# Patient Record
Sex: MALE | Race: OTHER | HISPANIC OR LATINO
[De-identification: names, ages, dates, MRNs, and addresses within clinical notes are randomized per-mention and may not be internally consistent; named-entity substitution may affect disease eponyms.]

---

## 2021-10-20 ENCOUNTER — TRANSCRIPTION ENCOUNTER (OUTPATIENT)
Age: 61
End: 2021-10-20

## 2021-10-21 ENCOUNTER — EMERGENCY (EMERGENCY)
Facility: HOSPITAL | Age: 61
LOS: 1 days | Discharge: HOME | End: 2021-10-21
Attending: INTERNAL MEDICINE | Admitting: INTERNAL MEDICINE
Payer: COMMERCIAL

## 2021-10-21 VITALS
SYSTOLIC BLOOD PRESSURE: 200 MMHG | HEIGHT: 66 IN | RESPIRATION RATE: 18 BRPM | WEIGHT: 180.78 LBS | HEART RATE: 84 BPM | TEMPERATURE: 98 F | OXYGEN SATURATION: 100 % | DIASTOLIC BLOOD PRESSURE: 103 MMHG

## 2021-10-21 PROCEDURE — 99284 EMERGENCY DEPT VISIT MOD MDM: CPT

## 2021-10-21 PROCEDURE — 99284 EMERGENCY DEPT VISIT MOD MDM: CPT | Mod: 25

## 2021-10-21 PROCEDURE — 12001 RPR S/N/AX/GEN/TRNK 2.5CM/<: CPT | Mod: XU

## 2021-10-21 PROCEDURE — 11740 EVACUATION SUBUNGUAL HMTMA: CPT | Mod: F2

## 2021-10-21 PROCEDURE — 90471 IMMUNIZATION ADMIN: CPT

## 2021-10-21 PROCEDURE — 73140 X-RAY EXAM OF FINGER(S): CPT

## 2021-10-21 PROCEDURE — 73140 X-RAY EXAM OF FINGER(S): CPT | Mod: 26,LT

## 2021-10-21 PROCEDURE — 90715 TDAP VACCINE 7 YRS/> IM: CPT

## 2021-10-21 RX ORDER — TETANUS TOXOID, REDUCED DIPHTHERIA TOXOID AND ACELLULAR PERTUSSIS VACCINE, ADSORBED 5; 2.5; 8; 8; 2.5 [IU]/.5ML; [IU]/.5ML; UG/.5ML; UG/.5ML; UG/.5ML
0.5 SUSPENSION INTRAMUSCULAR ONCE
Refills: 0 | Status: COMPLETED | OUTPATIENT
Start: 2021-10-21 | End: 2021-10-21

## 2021-10-21 RX ORDER — CEPHALEXIN 500 MG
1 CAPSULE ORAL
Qty: 14 | Refills: 0
Start: 2021-10-21 | End: 2021-10-27

## 2021-10-21 RX ORDER — IBUPROFEN 200 MG
600 TABLET ORAL ONCE
Refills: 0 | Status: COMPLETED | OUTPATIENT
Start: 2021-10-21 | End: 2021-10-21

## 2021-10-21 RX ADMIN — Medication 600 MILLIGRAM(S): at 12:02

## 2021-10-21 RX ADMIN — TETANUS TOXOID, REDUCED DIPHTHERIA TOXOID AND ACELLULAR PERTUSSIS VACCINE, ADSORBED 0.5 MILLILITER(S): 5; 2.5; 8; 8; 2.5 SUSPENSION INTRAMUSCULAR at 12:01

## 2021-10-21 RX ADMIN — Medication 1 TABLET(S): at 12:02

## 2021-10-21 NOTE — ED PROVIDER NOTE - ATTENDING CONTRIBUTION TO CARE
61 yr old male with hx of HTN presents with left 3rd finger laceration s/p cutting finger with saw while cutting wood prior to arrival. Denies any other injuries. Right hand dominant. Tetanus unknown.  left 3rd finger- + laceration noted through mid nail, NVI, positive ROM, positive radial pulse, less than 2 sec cap refill    xray reviewed, Dr. Bartholomew repaired lac. stable for dc and fu with Dr. Bartholomew. tetanus given in ED. dc with augmentin rx  Dr. Avila:  I have reviewed and discussed with the PA/ resident the case specifics, including the history, physical assessment, evaluation, conclusion, laboratory results, and medical plan. I agree with the contents, and conclusions. I have personally examined, and interviewed the patient.

## 2021-10-21 NOTE — ED PROVIDER NOTE - NSFOLLOWUPINSTRUCTIONS_ED_ALL_ED_FT
Follow up with Dr. Bartholomew :   Dr. Nazario Espinoza  [address] [place] [address] [address]  [place] [address]  [phone]  Fax: [phone]    Take augmentin as prescribed   Take motrin 400mg every 6 hours as needed for pain   Return to the ED if any worsening or persistent symptoms   Clean area as direct by Dr. Bartholomew.   You were given tetanus shot- your arm may be sore           Laceración del dedo    LO QUE NECESITA SABER:    ¿Qué es rossi laceración del dedo?Rossi laceración del dedo es un inga profundo en la piel. También pueden lesionarse los vasos sanguíneos, los huesos, las articulaciones, los tendones o los nervios.    ¿Cuáles son los signos y síntomas de rosis laceración del dedo?Los síntomas pueden depender de si los nervios, los tendones o los tejidos más profundos sufrieron lesiones. Puede presentar cualquiera de los siguientes signos o síntomas:   •Un inga, un desgarre o rossi herida de cuchillo en el dedo      •Sangrado, hinchazón o dolor      •Entumecimiento o sensación de hormigueo en el dedo      •Dificultad para  burger dedo      ¿Cómo se diagnostica rossi laceración del dedo?Informe a burger médico cómo sucedió la laceración. Burger médico examinará burger laceración y decidirá qué tratamiento necesita. Rossi radiografía, un ultrasonido o rossi tomografía computarizada podrían mostrar la presencia de un cuerpo extraño en la herida. Los objetos extraños incluyen un metal, gravilla y meredith. Los exámenes pueden también mostrar un daño al tejido profundo. Es posible que le administren un medio de contraste para que el área de la lesión se christi con mayor claridad en las imágenes. Dígale al médico si usted alguna vez ha tenido rossi reacción alérgica al líquido de contraste.    ¿Cómo se trata rossi laceración del dedo?El tratamiento depende del tamaño y la profundidad de la laceración. También depende de si tiene daños en los tejidos más profundos. Es posible que usted necesite alguno de los siguientes:   •La presiónpodría ser usada para ayudar a detener el sangrado.      •Limpieza de la heridapodría ser necesaria para retirar la suciedad o residuos. Captree disminuirá el riesgo de contraer rossi infección. Es posible que burger médico deba revisar la laceración en busca de objetos extraños o daños en los tejidos más profundos. Antes de que se le limpie y revise la laceración, se le pueden administrar medicamentos para adormecer la laxmi. Puede que también se le administre medicamento para ayudarlo a relajarse.      •Cerrar la heridacon puntos de sutura, pegamento médico o Steri-Strips™ puede ser necesario. Captree ayuda a que la herida cierre y sane. Se puede colocar rossi férula sobre los puntos de sutura, el pegamento o Steri-Strips. Captree ayudará a disminuir la tensión sobre la herida y evitar que se edelmira.             •Medicamentospuede administrarse para tratar el dolor o disminuir el riesgo de contraer rossi infección. Es posible que también le administren rossi vacuna contra el tétano. Burger médico determinará si usted necesita la inyección contra el tétano. Las heridas que presentan un alto riesgo de infección de tétano incluyen las heridas con suciedad o saliva en ellas. Informe a burger médico si usted ha tenido rossi vacuna contra el tétano o un refuerzo en los últimos 5 años.      •La cirugíapuede ser necesaria para limpiar la herida y extraer objetos extraños. También se puede necesitar rossi cirugía para reparar lesiones en los tendones, los nervios o los huesos.      ¿Qué puedo hacer para manejar mis síntomas?  •Aplique hieloen el dedo de 15 a 20 minutos cada hora o yusuf se le indique. Use rossi compresa de hielo o ponga hielo triturado en rossi bolsa de plástico. Cúbralo con rossi toalla antes de aplicarlo sobre burger piel. El hielo ayuda a evitar daño al tejido y a disminuir la inflamación y el dolor.      •Elevesu mano por encima del nivel de burger corazón tanto yusuf pueda. Captree va a disminuir inflamación y el dolor. Coloque burger mano sobre almohadas o cobijas para mantenerlas elevadas cómodamente.      •Use burger férula yusuf se le indique.Rossi férula lo inmovilizará y disminuirá la tensión sobre la herida. La férula puede ayudar a la herida a cicatrizar más rápido. Pregunte a burger médico cómo se debe colocar y retirar burger férula.      •Aplique pomadas para disminuir las cicatrices.No se aplique pomadas en la herida hasta que burger médico se lo indique. Es posible que necesite esperar hasta que la herida sane. Pregunte cuál pomada debe comprar y la frecuencia con que debe usarla.      ¿Cuándo dioni buscar atención inmediata?  •Burger herida se abre.      •La og empapa el vendaje.      •Tiene dolor intenso en el dedo o la mano.      •El dedo está pálido y frío.      •Tiene dificultad repentina para  el dedo.      •La inflamación empeora repentinamente.      •En la piel de burger herida le salen unas aleksandra butler.      ¿Cuándo dioni llamar a mi médico o especialista en micha?  •Comienza a tener entumecimiento o sensación de hormigueo.      •El dedo se siente caliente, se ve hinchado o figueroa, y drena pus.      •Tiene fiebre.      •Usted tiene preguntas o inquietudes acerca de burger condición o cuidado.      ACUERDOS SOBRE BURGER CUIDADO:    Usted tiene el derecho de ayudar a planear burger cuidado. Aprenda todo lo que pueda sobre burger condición y yusuf darle tratamiento. Discuta sung opciones de tratamiento con sung médicos para decidir el cuidado que usted desea recibir. Usted siempre tiene el derecho de rechazar el tratamiento.       © Copyright [employer] 2021           back to top                          © Copyright [employer] 2021

## 2021-10-21 NOTE — ED PROVIDER NOTE - PHYSICAL EXAMINATION
left 3rd finger- + laceration noted through mid nail, NVI, positive ROM, positive radial pulse, less than 2 sec cap refill

## 2021-10-21 NOTE — ED PROVIDER NOTE - PATIENT PORTAL LINK FT
You can access the FollowMyHealth Patient Portal offered by [facility] by registering at the following website: [email or web address]. By joining [employer]’s FollowMyHealth portal, you will also be able to view your health information using other applications (apps) compatible with our system.

## 2021-10-21 NOTE — ED PROVIDER NOTE - CLINICAL SUMMARY MEDICAL DECISION MAKING FREE TEXT BOX
61 yr old male with hx of HTN presents with left 3rd finger laceration s/p cutting finger with saw while cutting wood prior to arrival. Denies any other injuries. Right hand dominant. Tetanus unknown.  left 3rd finger- + laceration noted through mid nail, NVI, positive ROM, positive radial pulse, less than 2 sec cap refill 61 yr old male with hx of HTN presents with left 3rd finger laceration s/p cutting finger with saw while cutting wood prior to arrival. Denies any other injuries. Right hand dominant. Tetanus unknown.  left 3rd finger- + laceration noted through mid nail, NVI, positive ROM, positive radial pulse, less than 2 sec cap refill    xray reviewed, Dr. Bartholomew repaired lac. stable for dc and fu with Dr. Bartholomew. tetanus given in ED. dc with augmentin rx

## 2021-10-21 NOTE — ED PROVIDER NOTE - OBJECTIVE STATEMENT
61 yr old male with hx of HTN presents with left 3rd finger laceration s/p cutting finger with saw while cutting wood prior to arrival. Denies any other injuries. Right hand dominant. Tetanus unknown.

## 2022-11-10 PROBLEM — I10 ESSENTIAL (PRIMARY) HYPERTENSION: Chronic | Status: ACTIVE | Noted: 2021-10-21

## 2022-11-14 ENCOUNTER — APPOINTMENT (OUTPATIENT)
Dept: ORTHOPEDIC SURGERY | Facility: CLINIC | Age: 62
End: 2022-11-14

## 2022-11-14 VITALS — WEIGHT: 178 LBS | BODY MASS INDEX: 26.36 KG/M2 | HEIGHT: 69 IN

## 2022-11-14 VITALS — HEIGHT: 69 IN | BODY MASS INDEX: 26.36 KG/M2 | WEIGHT: 178 LBS

## 2022-11-14 DIAGNOSIS — S61.213D LACERATION W/OUT FOREIGN BODY OF LEFT MIDDLE FINGER W/OUT DAMAGE TO NAIL, SUBSEQUENT ENCOUNTER: ICD-10-CM

## 2022-11-14 DIAGNOSIS — Z78.9 OTHER SPECIFIED HEALTH STATUS: ICD-10-CM

## 2022-11-14 PROCEDURE — 99455 WORK RELATED DISABILITY EXAM: CPT

## 2022-11-14 PROCEDURE — 99072 ADDL SUPL MATRL&STAF TM PHE: CPT

## 2022-11-14 NOTE — WORK
[Has the patient reached Maximum Medical Improvement? If yes, indicate date___] : Yes, on [unfilled] [Is there permanent partial impairment?] : Yes [FreeTextEntry6] : 5% l middle finger

## 2022-11-14 NOTE — HISTORY OF PRESENT ILLNESS
[Work related] : work related [0] : 0 [Full time] : Work status: full time [] : no [FreeTextEntry1] : left hand [FreeTextEntry5] : 11/14 achy with gripping [de-identified] : 11/16/21 [de-identified] : Ben VARGAS [de-identified] : xrays

## 2023-06-18 ENCOUNTER — INPATIENT (INPATIENT)
Facility: HOSPITAL | Age: 63
LOS: 1 days | Discharge: HOME | DRG: 69 | End: 2023-06-20
Attending: HOSPITALIST | Admitting: FAMILY MEDICINE
Payer: COMMERCIAL

## 2023-06-18 VITALS
SYSTOLIC BLOOD PRESSURE: 141 MMHG | HEART RATE: 73 BPM | TEMPERATURE: 97 F | OXYGEN SATURATION: 100 % | WEIGHT: 182.54 LBS | DIASTOLIC BLOOD PRESSURE: 86 MMHG | HEIGHT: 68 IN | RESPIRATION RATE: 18 BRPM

## 2023-06-18 DIAGNOSIS — G45.9 TRANSIENT CEREBRAL ISCHEMIC ATTACK, UNSPECIFIED: ICD-10-CM

## 2023-06-18 DIAGNOSIS — I65.29 OCCLUSION AND STENOSIS OF UNSPECIFIED CAROTID ARTERY: Chronic | ICD-10-CM

## 2023-06-18 LAB
ALBUMIN SERPL ELPH-MCNC: 3.3 G/DL — SIGNIFICANT CHANGE UP (ref 3.3–5)
ALP SERPL-CCNC: 88 U/L — SIGNIFICANT CHANGE UP (ref 40–120)
ALT FLD-CCNC: 39 U/L — SIGNIFICANT CHANGE UP (ref 10–45)
ANION GAP SERPL CALC-SCNC: 8 MMOL/L — SIGNIFICANT CHANGE UP (ref 5–17)
APPEARANCE UR: CLEAR — SIGNIFICANT CHANGE UP
APTT BLD: 31.5 SEC — SIGNIFICANT CHANGE UP (ref 27.5–35.5)
AST SERPL-CCNC: 24 U/L — SIGNIFICANT CHANGE UP (ref 10–40)
BASOPHILS # BLD AUTO: 0.06 K/UL — SIGNIFICANT CHANGE UP (ref 0–0.2)
BASOPHILS NFR BLD AUTO: 0.8 % — SIGNIFICANT CHANGE UP (ref 0–2)
BILIRUB SERPL-MCNC: 0.2 MG/DL — SIGNIFICANT CHANGE UP (ref 0.2–1.2)
BILIRUB UR-MCNC: NEGATIVE — SIGNIFICANT CHANGE UP
BUN SERPL-MCNC: 15 MG/DL — SIGNIFICANT CHANGE UP (ref 7–23)
CALCIUM SERPL-MCNC: 8.8 MG/DL — SIGNIFICANT CHANGE UP (ref 8.4–10.5)
CHLORIDE SERPL-SCNC: 97 MMOL/L — SIGNIFICANT CHANGE UP (ref 96–108)
CO2 SERPL-SCNC: 27 MMOL/L — SIGNIFICANT CHANGE UP (ref 22–31)
COLOR SPEC: YELLOW — SIGNIFICANT CHANGE UP
CREAT SERPL-MCNC: 0.87 MG/DL — SIGNIFICANT CHANGE UP (ref 0.5–1.3)
DIFF PNL FLD: NEGATIVE — SIGNIFICANT CHANGE UP
EGFR: 97 ML/MIN/1.73M2 — SIGNIFICANT CHANGE UP
EOSINOPHIL # BLD AUTO: 0.22 K/UL — SIGNIFICANT CHANGE UP (ref 0–0.5)
EOSINOPHIL NFR BLD AUTO: 2.9 % — SIGNIFICANT CHANGE UP (ref 0–6)
GLUCOSE SERPL-MCNC: 100 MG/DL — HIGH (ref 70–99)
GLUCOSE UR QL: NEGATIVE MG/DL — SIGNIFICANT CHANGE UP
HCT VFR BLD CALC: 38.7 % — LOW (ref 39–50)
HGB BLD-MCNC: 13.6 G/DL — SIGNIFICANT CHANGE UP (ref 13–17)
IMM GRANULOCYTES NFR BLD AUTO: 0.5 % — SIGNIFICANT CHANGE UP (ref 0–0.9)
INR BLD: 1.18 RATIO — HIGH (ref 0.88–1.16)
KETONES UR-MCNC: NEGATIVE MG/DL — SIGNIFICANT CHANGE UP
LEUKOCYTE ESTERASE UR-ACNC: NEGATIVE — SIGNIFICANT CHANGE UP
LYMPHOCYTES # BLD AUTO: 1.91 K/UL — SIGNIFICANT CHANGE UP (ref 1–3.3)
LYMPHOCYTES # BLD AUTO: 25.1 % — SIGNIFICANT CHANGE UP (ref 13–44)
MCHC RBC-ENTMCNC: 31.2 PG — SIGNIFICANT CHANGE UP (ref 27–34)
MCHC RBC-ENTMCNC: 35.1 GM/DL — SIGNIFICANT CHANGE UP (ref 32–36)
MCV RBC AUTO: 88.8 FL — SIGNIFICANT CHANGE UP (ref 80–100)
MONOCYTES # BLD AUTO: 0.92 K/UL — HIGH (ref 0–0.9)
MONOCYTES NFR BLD AUTO: 12.1 % — SIGNIFICANT CHANGE UP (ref 2–14)
NEUTROPHILS # BLD AUTO: 4.46 K/UL — SIGNIFICANT CHANGE UP (ref 1.8–7.4)
NEUTROPHILS NFR BLD AUTO: 58.6 % — SIGNIFICANT CHANGE UP (ref 43–77)
NITRITE UR-MCNC: NEGATIVE — SIGNIFICANT CHANGE UP
NRBC # BLD: 0 /100 WBCS — SIGNIFICANT CHANGE UP (ref 0–0)
PH UR: 7 — SIGNIFICANT CHANGE UP (ref 5–8)
PLATELET # BLD AUTO: 235 K/UL — SIGNIFICANT CHANGE UP (ref 150–400)
POTASSIUM SERPL-MCNC: 3.7 MMOL/L — SIGNIFICANT CHANGE UP (ref 3.5–5.3)
POTASSIUM SERPL-SCNC: 3.7 MMOL/L — SIGNIFICANT CHANGE UP (ref 3.5–5.3)
PROT SERPL-MCNC: 7.1 G/DL — SIGNIFICANT CHANGE UP (ref 6–8.3)
PROT UR-MCNC: NEGATIVE MG/DL — SIGNIFICANT CHANGE UP
PROTHROM AB SERPL-ACNC: 13.7 SEC — HIGH (ref 10.5–13.4)
RBC # BLD: 4.36 M/UL — SIGNIFICANT CHANGE UP (ref 4.2–5.8)
RBC # FLD: 11.7 % — SIGNIFICANT CHANGE UP (ref 10.3–14.5)
SODIUM SERPL-SCNC: 132 MMOL/L — LOW (ref 135–145)
SP GR SPEC: 1.02 — SIGNIFICANT CHANGE UP (ref 1–1.03)
TROPONIN I, HIGH SENSITIVITY RESULT: 19.1 NG/L — SIGNIFICANT CHANGE UP
TROPONIN I, HIGH SENSITIVITY RESULT: 20 NG/L — SIGNIFICANT CHANGE UP
UROBILINOGEN FLD QL: 0.2 MG/DL — SIGNIFICANT CHANGE UP (ref 0.2–1)
WBC # BLD: 7.61 K/UL — SIGNIFICANT CHANGE UP (ref 3.8–10.5)
WBC # FLD AUTO: 7.61 K/UL — SIGNIFICANT CHANGE UP (ref 3.8–10.5)

## 2023-06-18 PROCEDURE — 71045 X-RAY EXAM CHEST 1 VIEW: CPT | Mod: 26

## 2023-06-18 PROCEDURE — 99223 1ST HOSP IP/OBS HIGH 75: CPT

## 2023-06-18 PROCEDURE — 99285 EMERGENCY DEPT VISIT HI MDM: CPT

## 2023-06-18 PROCEDURE — 70496 CT ANGIOGRAPHY HEAD: CPT | Mod: 26,MA

## 2023-06-18 PROCEDURE — 0042T: CPT | Mod: MA

## 2023-06-18 PROCEDURE — 93010 ELECTROCARDIOGRAM REPORT: CPT

## 2023-06-18 PROCEDURE — 70498 CT ANGIOGRAPHY NECK: CPT | Mod: 26,MA

## 2023-06-18 RX ORDER — SODIUM CHLORIDE 9 MG/ML
1000 INJECTION, SOLUTION INTRAVENOUS
Refills: 0 | Status: DISCONTINUED | OUTPATIENT
Start: 2023-06-18 | End: 2023-06-19

## 2023-06-18 RX ORDER — CLOPIDOGREL BISULFATE 75 MG/1
75 TABLET, FILM COATED ORAL DAILY
Refills: 0 | Status: DISCONTINUED | OUTPATIENT
Start: 2023-06-18 | End: 2023-06-19

## 2023-06-18 RX ORDER — ACETAMINOPHEN 500 MG
650 TABLET ORAL EVERY 6 HOURS
Refills: 0 | Status: DISCONTINUED | OUTPATIENT
Start: 2023-06-18 | End: 2023-06-20

## 2023-06-18 RX ORDER — ONDANSETRON 8 MG/1
4 TABLET, FILM COATED ORAL EVERY 8 HOURS
Refills: 0 | Status: DISCONTINUED | OUTPATIENT
Start: 2023-06-18 | End: 2023-06-20

## 2023-06-18 RX ORDER — ATORVASTATIN CALCIUM 80 MG/1
80 TABLET, FILM COATED ORAL AT BEDTIME
Refills: 0 | Status: DISCONTINUED | OUTPATIENT
Start: 2023-06-18 | End: 2023-06-20

## 2023-06-18 RX ORDER — PANTOPRAZOLE SODIUM 20 MG/1
40 TABLET, DELAYED RELEASE ORAL
Refills: 0 | Status: DISCONTINUED | OUTPATIENT
Start: 2023-06-18 | End: 2023-06-20

## 2023-06-18 RX ORDER — LOSARTAN POTASSIUM 100 MG/1
25 TABLET, FILM COATED ORAL DAILY
Refills: 0 | Status: DISCONTINUED | OUTPATIENT
Start: 2023-06-18 | End: 2023-06-20

## 2023-06-18 RX ORDER — METOPROLOL TARTRATE 50 MG
100 TABLET ORAL DAILY
Refills: 0 | Status: DISCONTINUED | OUTPATIENT
Start: 2023-06-18 | End: 2023-06-20

## 2023-06-18 RX ORDER — LANOLIN ALCOHOL/MO/W.PET/CERES
3 CREAM (GRAM) TOPICAL AT BEDTIME
Refills: 0 | Status: DISCONTINUED | OUTPATIENT
Start: 2023-06-18 | End: 2023-06-20

## 2023-06-18 RX ORDER — ENOXAPARIN SODIUM 100 MG/ML
40 INJECTION SUBCUTANEOUS EVERY 24 HOURS
Refills: 0 | Status: DISCONTINUED | OUTPATIENT
Start: 2023-06-18 | End: 2023-06-20

## 2023-06-18 RX ORDER — ASPIRIN/CALCIUM CARB/MAGNESIUM 324 MG
81 TABLET ORAL DAILY
Refills: 0 | Status: DISCONTINUED | OUTPATIENT
Start: 2023-06-18 | End: 2023-06-20

## 2023-06-18 RX ADMIN — ATORVASTATIN CALCIUM 80 MILLIGRAM(S): 80 TABLET, FILM COATED ORAL at 21:53

## 2023-06-18 RX ADMIN — ENOXAPARIN SODIUM 40 MILLIGRAM(S): 100 INJECTION SUBCUTANEOUS at 19:52

## 2023-06-18 RX ADMIN — CLOPIDOGREL BISULFATE 75 MILLIGRAM(S): 75 TABLET, FILM COATED ORAL at 21:54

## 2023-06-18 NOTE — ED PROVIDER NOTE - CLINICAL SUMMARY MEDICAL DECISION MAKING FREE TEXT BOX
Noncon CT negative.  Perfusion studies unremarkable.  There is a patent stent on the right ICA.  Left ICA consistent with prior CEA.  There is moderate focal stenosis of the origin of the left M2.  There is persistent fetal origin left PCA with a hypoplastic P1.  There is mild to moderate focal stenosis in the bilateral P2.  I discussed these findings with teleneurology, they recommended admission for MRI.  Patient is already on aspirin, neuro not recommending Plavix prior to MRI.  Patient remains at baseline, no deficits, NIH score 0. Case endorsed to hospitalist, patient excepted for admission to the floor. EKG shows sinus rhythm at 73, normal axis, normal intervals.  Incomplete right bundle noted, T wave inversions in the lateral leads, no obvious ischemic change.

## 2023-06-18 NOTE — ED ADULT TRIAGE NOTE - CHIEF COMPLAINT QUOTE
Patient c/o left arm, leg and facial numbness starting yesterday. Patient denies chest pain, SOB, headache, dizziness and blurry vision. Patient endorses a hx of carotid stenosis with stent placement x2. Last known well was 9:30 am. MD Simon called to triage CODE stroke call Patient c/o left arm, leg and facial numbness starting at 9:30am. Patient denies chest pain, SOB, headache, dizziness and blurry vision. Patient endorses a hx of carotid stenosis with stent placement x2. MD Simon called to triage CODE stroke called at

## 2023-06-18 NOTE — H&P ADULT - SOCIAL HISTORY: TOBACCO USE
Parent notify that patient's strep culture returned with negative results.  Parent verbalized understanding of results and improvement in patient's symptoms.  Instructed parent to contact clinic with any questions or concerns.  
1 pk daily x 50 yrs

## 2023-06-18 NOTE — ED PROVIDER NOTE - PHYSICAL EXAMINATION
Gen: Awake and Alert, in NAD  HEENT: Normocephalic, Atraumatic. PERRL, EOMI.  Neck: Supple, FROM. No audible bruit  CV: RRR, +S1/S2, No M/R/G  Pulm: Normal WOB. RR WNL. No wheeze, rhonchi, rales. No accessory muscle use.  Abd: S NT ND.  MSK: Moving all 4, no evidence of trauma  Skin: Normal color, temp, turgor. No rashes appreciated.  Neuro: AOx3, No focal deficits appreciated. No facial droop.  No flattening of the nasolabial fold.  No gaze preference.  No facial asymmetry.  Equal  strength in the upper extremities.  Equal strength against gravity in the bilateral lower extremities.  Follows basic commands.  Short-term memory intact.  CN II through XII intact.  No sensory deficits.

## 2023-06-18 NOTE — ED ADULT NURSE NOTE - NSFALLHARMRISKINTERV_ED_ALL_ED

## 2023-06-18 NOTE — H&P ADULT - NSHPLABSRESULTS_GEN_ALL_CORE
13.6                 132  | 27   | 15           7.61  >-----------< 235     ------------------------< 100                   38.7                 3.7  | 97   | 0.87                                         Ca 8.8   Mg x     Ph x      Urinalysis Basic - ( 2023 19:00 )    Color: Yellow / Appearance: Clear / S.022 / pH: x  Gluc: x / Ketone: Negative mg/dL  / Bili: Negative / Urobili: 0.2 mg/dL   Blood: x / Protein: Negative mg/dL / Nitrite: Negative   Leuk Esterase: Negative / RBC: x / WBC x   Sq Epi: x / Non Sq Epi: x / Bacteria: x          CAPILLARY BLOOD GLUCOSE          Labs reviewed:     CXR personally reviewed: napd    ECG reviewed and interpreted: sinus at 73    < from: CT Angio Neck Stroke Protocol w/ IV Cont (23 @ 17:45) >    IMPRESSION:    CT PERFUSION demonstrated: No core infarct. No active ischemia.  If symptoms persist consider follow up head CT or MRI, MRA  if no   contraindication.    CTA COW:  Moderate focal stenosis at the origin of one of the M2 segments   on the left MCA.  Persistent fetal origin left PCA with hypoplastic P1 segment.  Mild to moderate focal stenoses suspected in bilateral P2 segments.    CTA NECK: Patent, ECAs, ICAs, no  hemodynamically significant stenosis at    ICA origins by NASCET criteria. Right ICA stent appears to be patent.   Evidence suggesting left sided endarterectomy.  Bilateral vertebral arteries are patent without flow limiting stenosis.    < end of copied text >

## 2023-06-18 NOTE — H&P ADULT - ASSESSMENT
62-year-old male with past medical history of smoking  hypertension and bilateral carotid stenosis s/p stent (R) and CEA (L)  presents to the ED for evaluation of neurologic symptoms.  He is accompanied by his son who contributes this history.  States that around 930 this morning, while getting out of his car, he experienced a transient episode of left arm numbness, as well as numbness and drooping in the left side of his face.  His symptoms resolved quickly, and recurred several hours later after which she came to the emergency department.  Son notes that he also had some transient facial drooping noted yesterday. He has otherwise been in his normal state of health      left facial numbness and left sided UE weakness- resolved, multiple episodes since yesterday, r/o cva  admit to tele  neuro checks  am labs  passes bedside s/s eval   will need MRI in am  current smoker- cessation advised  ekg in am  tte in am  carotid doppler in am  CTH- Moderate focal stenosis at the origin of one of the M2 segments on the left MCA.Persistent fetal origin left PCA with hypoplastic P1 segment.Mild to moderate focal stenoses suspected in bilateral P2 segments.  ED discussed tcase with tele- neurology, they recommended admission for MRI.  Patient is already on aspirin, neuro not recommending Plavix prior to MRI.  Patient remains at baseline, no deficits, NIH score 0.   c/w asa, will start Plavix  initially refusing to stay- but agreed later.    HTN- c/w losartan, BB, clonidine    vte ppx- Lovenox    gi ppx- ppi

## 2023-06-18 NOTE — PATIENT PROFILE ADULT - FALL HARM RISK - HARM RISK INTERVENTIONS

## 2023-06-18 NOTE — ED ADULT NURSE NOTE - OBJECTIVE STATEMENT
Pt. to ED with son stating since around 9am he has had left side arm and leg numbness.  Pt. denies any at this time.  Pt. activated as code stroke by triage RN.  Facial symmetry noted, no arm drift noted.  Pt. ambulating in triage with steady gait.

## 2023-06-18 NOTE — ED ADULT TRIAGE NOTE - CODE STROKE DETAILS
Patient c/o left leg, arm and facial numbness starting at 0930 today. MD Simon called to triage CODE stroke activated as per MD.

## 2023-06-18 NOTE — H&P ADULT - HISTORY OF PRESENT ILLNESS
62-year-old male with past medical history of smoking  hypertension and bilateral carotid stenosis s/p stent (R) and CEA (L)  presents to the ED for evaluation of neurologic symptoms.  He is accompanied by his son who contributes this history.  States that around 930 this morning, while getting out of his car, he experienced a transient episode of left arm numbness, as well as numbness and drooping in the left side of his face.  His symptoms resolved quickly, and recurred several hours later after which she came to the emergency department.  Son notes that he also had some transient facial drooping noted yesterday. He has otherwise been in his normal state of health    DENIES LOC, SOB, CP. dysuria

## 2023-06-18 NOTE — ED PROVIDER NOTE - CARE PLAN
Assessment and plan of treatment:	This is a 62-year-old male presenting to the ED with symptoms consistent with TIA.  Current NIH stroke score is 0, and he is out of the tPA window given his onset of symptoms.  Plan for broad labs, noncon CT head, CT angio of the head and neck, and CT perfusion study.  Likely admit, may require transfer pending imaging findings.   Principal Discharge DX:	Transient ischemic attack (TIA)  Assessment and plan of treatment:	This is a 62-year-old male presenting to the ED with symptoms consistent with TIA.  Current NIH stroke score is 0, and he is out of the tPA window given his onset of symptoms.  Plan for broad labs, noncon CT head, CT angio of the head and neck, and CT perfusion study.  Likely admit, may require transfer pending imaging findings.   1

## 2023-06-18 NOTE — H&P ADULT - NSHPPHYSICALEXAM_GEN_ALL_CORE
Vital Signs Last 24 Hrs  T(C): 36.2 (18 Jun 2023 17:15), Max: 36.2 (18 Jun 2023 17:15)  T(F): 97.2 (18 Jun 2023 17:15), Max: 97.2 (18 Jun 2023 17:15)  HR: 66 (18 Jun 2023 18:46) (66 - 73)  BP: 158/85 (18 Jun 2023 18:46) (138/68 - 158/85)  BP(mean): --  RR: 17 (18 Jun 2023 18:46) (16 - 18)  SpO2: 96% (18 Jun 2023 18:46) (96% - 100%)    Parameters below as of 18 Jun 2023 18:46  Patient On (Oxygen Delivery Method): room air      GENERAL- NAD  EAR/NOSE/MOUTH/THROAT -  MMM  EYES- WEI, conjunctiva and Sclera clear  NECK- supple  RESPIRATORY-  clear to auscultation bilaterally, non laboured breathing  CARDIOVASCULAR - SIS2, RRR  GI - soft NT BS present  EXTREMITIES- no pedal edema  NEUROLOGY- no gross focal deficits  SKIN- no rashes, warm to touch  PSYCHIATRY- AAO X 3  MUSCULOSKELETAL- ROM normal

## 2023-06-18 NOTE — ED ADULT NURSE NOTE - CHIEF COMPLAINT QUOTE
Patient c/o left arm, leg and facial numbness starting at 9:30am. Patient denies chest pain, SOB, headache, dizziness and blurry vision. Patient endorses a hx of carotid stenosis with stent placement x2. MD Simon called to triage CODE stroke called at

## 2023-06-18 NOTE — ED PROVIDER NOTE - OBJECTIVE STATEMENT
62-year-old male with past medical history of hypertension and bilateral carotid stenosis s/p stent (R) and CEA (L) presents to the ED for evaluation of neurologic symptoms.  He is accompanied by his son who contributes this history.  States that around 930 this morning, while getting out of his car, he experienced a transient episode of left arm numbness, as well as numbness and drooping in the left side of his face.  His symptoms resolved quickly, and recurred several hours later after which she came to the emergency department.  Son notes that he also had some transient facial drooping noted yesterday. He has otherwise been in his normal state of health

## 2023-06-18 NOTE — ED PROVIDER NOTE - PLAN OF CARE
This is a 62-year-old male presenting to the ED with symptoms consistent with TIA.  Current NIH stroke score is 0, and he is out of the tPA window given his onset of symptoms.  Plan for broad labs, noncon CT head, CT angio of the head and neck, and CT perfusion study.  Likely admit, may require transfer pending imaging findings.

## 2023-06-19 LAB
A1C WITH ESTIMATED AVERAGE GLUCOSE RESULT: 5.7 % — HIGH (ref 4–5.6)
ALBUMIN SERPL ELPH-MCNC: 3.2 G/DL — LOW (ref 3.3–5)
ALP SERPL-CCNC: 74 U/L — SIGNIFICANT CHANGE UP (ref 40–120)
ALT FLD-CCNC: 36 U/L — SIGNIFICANT CHANGE UP (ref 10–45)
ANION GAP SERPL CALC-SCNC: 7 MMOL/L — SIGNIFICANT CHANGE UP (ref 5–17)
AST SERPL-CCNC: 19 U/L — SIGNIFICANT CHANGE UP (ref 10–40)
BASOPHILS # BLD AUTO: 0.05 K/UL — SIGNIFICANT CHANGE UP (ref 0–0.2)
BASOPHILS NFR BLD AUTO: 0.8 % — SIGNIFICANT CHANGE UP (ref 0–2)
BILIRUB SERPL-MCNC: 0.4 MG/DL — SIGNIFICANT CHANGE UP (ref 0.2–1.2)
BUN SERPL-MCNC: 13 MG/DL — SIGNIFICANT CHANGE UP (ref 7–23)
CALCIUM SERPL-MCNC: 9 MG/DL — SIGNIFICANT CHANGE UP (ref 8.4–10.5)
CHLORIDE SERPL-SCNC: 101 MMOL/L — SIGNIFICANT CHANGE UP (ref 96–108)
CHOLEST SERPL-MCNC: 148 MG/DL — SIGNIFICANT CHANGE UP
CO2 SERPL-SCNC: 29 MMOL/L — SIGNIFICANT CHANGE UP (ref 22–31)
CREAT SERPL-MCNC: 0.93 MG/DL — SIGNIFICANT CHANGE UP (ref 0.5–1.3)
EGFR: 93 ML/MIN/1.73M2 — SIGNIFICANT CHANGE UP
EOSINOPHIL # BLD AUTO: 0.17 K/UL — SIGNIFICANT CHANGE UP (ref 0–0.5)
EOSINOPHIL NFR BLD AUTO: 2.6 % — SIGNIFICANT CHANGE UP (ref 0–6)
ESTIMATED AVERAGE GLUCOSE: 117 MG/DL — HIGH (ref 68–114)
FOLATE SERPL-MCNC: 9.1 NG/ML — SIGNIFICANT CHANGE UP
GLUCOSE SERPL-MCNC: 105 MG/DL — HIGH (ref 70–99)
HCT VFR BLD CALC: 37.5 % — LOW (ref 39–50)
HCV AB S/CO SERPL IA: 0.13 S/CO — SIGNIFICANT CHANGE UP (ref 0–0.99)
HCV AB SERPL-IMP: SIGNIFICANT CHANGE UP
HDLC SERPL-MCNC: 42 MG/DL — SIGNIFICANT CHANGE UP
HGB BLD-MCNC: 13.1 G/DL — SIGNIFICANT CHANGE UP (ref 13–17)
IMM GRANULOCYTES NFR BLD AUTO: 0.3 % — SIGNIFICANT CHANGE UP (ref 0–0.9)
LIPID PNL WITH DIRECT LDL SERPL: 77 MG/DL — SIGNIFICANT CHANGE UP
LYMPHOCYTES # BLD AUTO: 0.64 K/UL — LOW (ref 1–3.3)
LYMPHOCYTES # BLD AUTO: 9.7 % — LOW (ref 13–44)
MCHC RBC-ENTMCNC: 31.3 PG — SIGNIFICANT CHANGE UP (ref 27–34)
MCHC RBC-ENTMCNC: 34.9 GM/DL — SIGNIFICANT CHANGE UP (ref 32–36)
MCV RBC AUTO: 89.7 FL — SIGNIFICANT CHANGE UP (ref 80–100)
MONOCYTES # BLD AUTO: 0.68 K/UL — SIGNIFICANT CHANGE UP (ref 0–0.9)
MONOCYTES NFR BLD AUTO: 10.3 % — SIGNIFICANT CHANGE UP (ref 2–14)
NEUTROPHILS # BLD AUTO: 5.04 K/UL — SIGNIFICANT CHANGE UP (ref 1.8–7.4)
NEUTROPHILS NFR BLD AUTO: 76.3 % — SIGNIFICANT CHANGE UP (ref 43–77)
NON HDL CHOLESTEROL: 106 MG/DL — SIGNIFICANT CHANGE UP
NRBC # BLD: 0 /100 WBCS — SIGNIFICANT CHANGE UP (ref 0–0)
PLATELET # BLD AUTO: 189 K/UL — SIGNIFICANT CHANGE UP (ref 150–400)
POTASSIUM SERPL-MCNC: 3.4 MMOL/L — LOW (ref 3.5–5.3)
POTASSIUM SERPL-SCNC: 3.4 MMOL/L — LOW (ref 3.5–5.3)
PROT SERPL-MCNC: 7 G/DL — SIGNIFICANT CHANGE UP (ref 6–8.3)
RBC # BLD: 4.18 M/UL — LOW (ref 4.2–5.8)
RBC # FLD: 12 % — SIGNIFICANT CHANGE UP (ref 10.3–14.5)
SODIUM SERPL-SCNC: 137 MMOL/L — SIGNIFICANT CHANGE UP (ref 135–145)
TRIGL SERPL-MCNC: 146 MG/DL — SIGNIFICANT CHANGE UP
TSH SERPL-MCNC: 2.93 UIU/ML — SIGNIFICANT CHANGE UP (ref 0.36–3.74)
VIT B12 SERPL-MCNC: 484 PG/ML — SIGNIFICANT CHANGE UP (ref 232–1245)
WBC # BLD: 6.6 K/UL — SIGNIFICANT CHANGE UP (ref 3.8–10.5)
WBC # FLD AUTO: 6.6 K/UL — SIGNIFICANT CHANGE UP (ref 3.8–10.5)

## 2023-06-19 PROCEDURE — 99233 SBSQ HOSP IP/OBS HIGH 50: CPT

## 2023-06-19 PROCEDURE — 93880 EXTRACRANIAL BILAT STUDY: CPT | Mod: 26

## 2023-06-19 PROCEDURE — 99222 1ST HOSP IP/OBS MODERATE 55: CPT

## 2023-06-19 PROCEDURE — 93306 TTE W/DOPPLER COMPLETE: CPT | Mod: 26

## 2023-06-19 RX ORDER — ASPIRIN/CALCIUM CARB/MAGNESIUM 324 MG
1 TABLET ORAL
Refills: 0 | DISCHARGE

## 2023-06-19 RX ORDER — METOPROLOL TARTRATE 50 MG
1 TABLET ORAL
Refills: 0 | DISCHARGE

## 2023-06-19 RX ORDER — LOSARTAN POTASSIUM 100 MG/1
0 TABLET, FILM COATED ORAL
Refills: 0 | DISCHARGE

## 2023-06-19 RX ORDER — ASPIRIN/CALCIUM CARB/MAGNESIUM 324 MG
0 TABLET ORAL
Refills: 0 | DISCHARGE

## 2023-06-19 RX ORDER — METOPROLOL TARTRATE 50 MG
0 TABLET ORAL
Refills: 0 | DISCHARGE

## 2023-06-19 RX ORDER — LOSARTAN/HYDROCHLOROTHIAZIDE 100MG-25MG
1 TABLET ORAL
Refills: 0 | DISCHARGE

## 2023-06-19 RX ORDER — ATORVASTATIN CALCIUM 80 MG/1
1 TABLET, FILM COATED ORAL
Refills: 0 | DISCHARGE

## 2023-06-19 RX ORDER — POTASSIUM CHLORIDE 20 MEQ
40 PACKET (EA) ORAL ONCE
Refills: 0 | Status: COMPLETED | OUTPATIENT
Start: 2023-06-19 | End: 2023-06-19

## 2023-06-19 RX ADMIN — ENOXAPARIN SODIUM 40 MILLIGRAM(S): 100 INJECTION SUBCUTANEOUS at 21:17

## 2023-06-19 RX ADMIN — CLOPIDOGREL BISULFATE 75 MILLIGRAM(S): 75 TABLET, FILM COATED ORAL at 12:19

## 2023-06-19 RX ADMIN — ATORVASTATIN CALCIUM 80 MILLIGRAM(S): 80 TABLET, FILM COATED ORAL at 21:18

## 2023-06-19 RX ADMIN — Medication 0.1 MILLIGRAM(S): at 05:53

## 2023-06-19 RX ADMIN — SODIUM CHLORIDE 75 MILLILITER(S): 9 INJECTION, SOLUTION INTRAVENOUS at 09:28

## 2023-06-19 RX ADMIN — PANTOPRAZOLE SODIUM 40 MILLIGRAM(S): 20 TABLET, DELAYED RELEASE ORAL at 05:54

## 2023-06-19 RX ADMIN — Medication 100 MILLIGRAM(S): at 05:53

## 2023-06-19 RX ADMIN — Medication 81 MILLIGRAM(S): at 12:19

## 2023-06-19 RX ADMIN — LOSARTAN POTASSIUM 25 MILLIGRAM(S): 100 TABLET, FILM COATED ORAL at 05:53

## 2023-06-19 RX ADMIN — Medication 40 MILLIEQUIVALENT(S): at 14:06

## 2023-06-19 NOTE — CONSULT NOTE ADULT - ASSESSMENT
Patient is a 62 year old man admitted 6/18/23 with transient left face, arm, and leg numbness. The patient states yesterday he was driving his car and suddenly felt numbness left upper lip, left arm and left leg which lasted a few seconds.  He denies HA or other associated neurological complaints. He denies fever,chest pain, or trauma. He was later in the day brought by family members to the hospital. In prior notes the son was reported as noting left facial droop also. Telestroke was consulted and advised continue ASA.  Presently, he denies any complaints. Neurological exam as above. Would be concerned with TIA.    1) CT head as above no cute hemorrhage  2) CT Perfusion as above no core infarct. No active ischemia  3) CTA COW as above moderate focal stenosis origin of M2 segment Left MCA. mild to moderate focal stenoses suspected bilateral P2 segments  4) CTA Neck as above no hemodynamically significant stenosis at ICA origins. Right ICA stent patent. Evidence suggesting left sided endarterectomy. Bilateral vertebral arteries patient  5) Echocardiogram as above no thrombus  6) Carotid Duplex  7) MRI Head with no contrast  8) Continue ASA 81 mg daily

## 2023-06-19 NOTE — SWALLOW BEDSIDE ASSESSMENT ADULT - SWALLOW EVAL: DIAGNOSIS
Patient presents with evidence of chronic oral dysphagia in the setting of missing dentition. Patient self-fed trials of thin liquids via cup, soft and bite sized and regular solids. Adequate acceptance and anterior containment. Prolonged mastication for breakdown of regular solids, but functional. Mild-moderate oral residue following deglutition of regular trials, which patient cleared independently with liquid wash. Pharyngeal motor response appreciated via digital palpation. No overt signs of aspiration or airway invasion. Patient denied globus sensation and odynophagia.

## 2023-06-19 NOTE — PHYSICAL THERAPY INITIAL EVALUATION ADULT - ADDITIONAL COMMENTS
pt lives in private house w/family, 3 constanza w/rail, 1 flt to br. pt independent w/ambulation and all ADL's, +

## 2023-06-19 NOTE — SWALLOW BEDSIDE ASSESSMENT ADULT - COMMENTS
Patient reports consuming softer foods at home due to preference and comfort due to missing lower dentition. Patient has partial denture at home, but still prefers softer foods. Lunch tray consisting of chicken caesar salad present with patient verbalizing he was unable to eat/breakdown croutons.     WBC: 6.60    CT Brain 6/18: "CT PERFUSION demonstrated: No core infarct. No active ischemia. If symptoms persist consider follow up head CT or MRI, MRA  if no contraindication. CTA COW:  Moderate focal stenosis at the origin of one of the M2 segments on the left MCA. Persistent fetal origin left PCA with hypoplastic P1 segment. Mild to moderate focal stenoses suspected in bilateral P2 segments. CTA NECK: Patent, ECAs, ICAs, no  hemodynamically significant stenosis at ICA origins by NASCET criteria. Right ICA stent appears to be patent.  Evidence suggesting left sided endarterectomy. Bilateral vertebral arteries are patent without flow limiting stenosis."    CXR 6/18: " Linear atelectasis versus scar in both lower lungs. The   lungs are otherwise clear."

## 2023-06-19 NOTE — CONSULT NOTE ADULT - ASSESSMENT
Assessment:  Luis Fernando Aly is a 62 year old man, cigarette smoker with past medical history of Hypertension, Carotid stenosis (s/p right carotid stent and left CEA) presents with transient episode of left arm numbness and left-sided facial drop.    Cardiology consulted due to abnormal echocardiogram findings with LVEF ~40% with moderate global left ventricle hypokinesis.  Assessment:  Luis Fernando Aly is a 62 year old man, cigarette smoker with past medical history of Hypertension, Carotid stenosis (s/p right carotid stent and left CEA) presents with transient episode of left arm numbness and left-sided facial drop, concerning for TIA.    Cardiology consulted due to abnormal echocardiogram findings with LVEF ~40% with moderate global left ventricle hypokinesis and abnormal LV strain. Telemetry reviewed and consistent with normal sinus rhythm and lateral T wave inversions, troponins not elevated, does not appear to be an acute coronary syndrome. CT head consistent with left MCA moderate focal stenosis, patent right ICA stent.     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 Patient reports remote history of unremarkable coronary angiogram, will need repeat ischemic evaluation, however patient states that he does not want to stay inpatient for further workup and wants to follow up with his cardiologist, discussed risks of myocardial infarction/death without having an ischemic evaluation, will re-address tomorrow. Continue Metoprolol succinate.   [] Left arm numbness and facial droop: Symptoms resolved, follow up Neurology, plan for MRI head. Patient receiving Aspirin, Atorvastatin     We will continue to follow along.    Cornell Ba MD  Cardiology

## 2023-06-19 NOTE — OCCUPATIONAL THERAPY INITIAL EVALUATION ADULT - PERTINENT HX OF CURRENT PROBLEM, REHAB EVAL
62-year-old male with past medical history of smoking  hypertension and bilateral carotid stenosis s/p stent (R) and CEA (L)  presents to the ED for evaluation of neurologic symptoms.  He is accompanied by his son who contributes this history.  States that around 930 this morning, while getting out of his car, he experienced a transient episode of left arm numbness, as well as numbness and drooping in the left side of his face.  His symptoms resolved quickly, and recurred several hours later after which she came to the emergency department.  Son notes that he also had some transient facial drooping noted yesterday. He has otherwise been in his normal state of health.

## 2023-06-19 NOTE — CONSULT NOTE ADULT - SUBJECTIVE AND OBJECTIVE BOX
Patient is a 62 year old man admitted 6/18/23 with transient left face, arm, and leg numbness. The patient states yesterday he was driving his car and suddenly felt numbness left upper lip, left arm and left leg which lasted a few seconds.  He denies HA or other associated neurological complaints. He denies fever,chest pain, or trauma. He was later in the day brought by family members to the hospital. In prior notes the son was reported as noting left facial droop also. Telestroke was consulted and advised continue ASA.  Presently, he denies any complaints.    PMH: HTN          S/P Right Carotid stent 10 years ago'          S/P Left Carotid endarterectomy    SH: No allergy    Exam: Awake, alert, appropriate, speaks to this physician in Upper sorbian as only knows a few words in English           Pupils 2.5mm, EOM intact, no nystagmus, VFF           CN II-XII intact           Motor tone and strength RUE  5/5        LUE 5/5                                                RLE  5/5          LLE 5/5         Sensation intact                          13.1   6.60  )-----------( 189      ( 19 Jun 2023 07:35 )             37.5   06-19    137  |  101  |  13  ----------------------------<  105<H>  3.4<L>   |  29  |  0.93    Ca    9.0      19 Jun 2023 07:35    TPro  7.0  /  Alb  3.2<L>  /  TBili  0.4  /  DBili  x   /  AST  19  /  ALT  36  /  AlkPhos  74  06-19    Lipid Profile (06.19.23 @ 07:35)    Cholesterol, Serum: 148 mg/dL   Triglycerides, Serum: 146 mg/dL   HDL Cholesterol, Serum: 42 mg/dL   Non HDL Cholesterol: 106: Patients Atherosclerotic Cardiovascular Disease (ASCVD) Risk  Optimal Level (mg/dL)  LDL Cholesterol (LDL-C)  All Patients                                < 100  ASCVD at Very High Risk1    < 70  Non-HDL Cholesterol (Non-HDL-C)  All Patients                       < 130  ASCVD at Very High Risk1   < 100  Non-HDL-Cholesterol (Non-HDL-C) is also a key target for cardiovascular  risk reduction.  Consider Familial Hypercholesterolemia when: LDL-C > 190 mg/dL or  Non-HDL-C > 220 mg/dL.  LDL-C calculation using the Friedewald equation is not provided when  triglycerides > 400 mg/dL, in which case we recommend repeating the test  after fasting, if it was not done before.  When triglycerides >150 mg/dL, calculated LDL-C is provided but maystill  be inaccurate (particularly when LDL-C < 70 mg/dL). It can be  recalculated off-line using other equations (e.g. Devin SS, Armen MJ,  Sapna VENTURA, et al.EDSON 2013;310:2061 - 8).  1 Alok Patel,et al. "2019 AHA/ACC. . . guideline on the  management of blood cholesterol: a report of the American College of  Cardiology/American Heart Association Task Force on Clinical Practice  Guidelines." Circulation;139:e1082 - e1143.  These values apply only to persons 20 years and older.  Lipid Panel updated with new test, reference ranges and interpretive  comments effective 10-. mg/dL   LDL Cholesterol Calculated: 77 mg/dL    < from: TTE Echo Complete w/o Contrast w/ Doppler (06.19.23 @ 08:00) >    Summary:   1. Moderately decreased global left ventricular systolic function.   2. Left ventricular ejection fraction, by visual estimation, is 40%.   3. Calculated LVEF by Simpsons Biplane Method 35%. Abnormal left   ventricle global longitudinal strain. Moderate global left ventricle   hypokinesis.   4. Increased LV wall thickness.   5. Normal left ventricular internal cavity size.   6. Normal right ventricular size and function.   7. Theleft atrium is normal in size.   8. The right atrium is normal in size.   9. Mild mitral valve leaflet thickening and calcification.  10. Mild mitral valve regurgitation.  11. Mild aortic valve leaflet calcification. No aortic valve stenosis.  12. There is mild aortic root calcification.  13. There is no evidence of pericardial effusion.    [id number] Cornell Ba MD Electronically signed on   6/19/2023 at 12:08:15 PM    < end of copied text >            < from: CT Brain Stroke Protocol (06.18.23 @ 17:41) >    COMPARISON EXAMINATION: None.    FINDINGS:  HEAD CT:  VENTRICLES AND SULCI: Ventricles and sulci are unremarkable for patient   age.  INTRA-AXIAL: No intracranial mass, acute hemorrhage, or midline shift is   present.  EXTRA-AXIAL: No extra-axial fluid collection is present.  INTRACRANIAL HEMORRHAGE: None.    VISUALIZED SINUSES: No air-fluid levels are identified.  VISUALIZED MASTOIDS:  Clear.  CALVARIUM:  Intact.  MISCELLANEOUS:  None.    SOFT TISSUES: Unremarkable.  BONES: Unremarkable.      IMPRESSION:  HEAD CT: No acute hemorrhage or midline shift.    Discussed with Dr. Alvarez in the ED at 5:47 PM.    --- End of Report ---    < from: CT Angio Brain Stroke Protocol  w/ IV Cont (06.18.23 @ 17:43) >    CTA [place] [facility] [place]:  After the intravenous power injection of non-ionic contrast material,   serial thin sections were obtained through the intracranial circulation   on a multislice CT scanner.  Images were reformatted using a dedicated 3D   software package and viewed on a dedicated workstation inmultiple planes.    CTA NECK:  After the intravenous power injection of non-ionic contrast material,   serial thin sections were obtained through the cervical circulation on a   multislice CT scanner.  Images were reformatted using a dedicated 3D   software package and viewed on a dedicated workstation in multiple planes.    COMPARISON EXAMINATION: None.    FINDINGS:    CT RAPID PERFUSION:  CBF<30% volume: 0 ml  Tmax>6.0 s volume: 0 ml  Mismatch volume: 0 ml  Mismatch ratio: none    CORE INFARCT:None.  TISSUE AT RISK: None.  MISMATCH RATIO: None.      CTA [place] [facility] [place]:  ANTERIOR CIRCULATION    < end of copied text >  < from: CT Angio Brain Stroke Protocol  w/ IV Cont (06.18.23 @ 17:43) >  NOID, BIFURCATION SEGMENTS: Patent without flow   limiting stenosis.    ANTERIOR CEREBRAL ARTERIES: Bilateral A1, anterior communicating and A2   anterior cerebral arteries are unremarkable in course and caliber without   flow limiting stenosis.    MIDDLE CEREBRAL ARTERIES: Patent right M1, M2, and distal MCA branches   without flow limiting stenosis. Moderate focal stenosis at the origin of   one of the M2 segments on the left MCA.      POSTERIOR CIRCULATION:  VERTEBRAL ARTERIES: Patent without flow limiting stenosis.  BASILAR ARTERY: Patent no flow limiting stenosis.  POSTERIOR CEREBRAL ARTERIES: Patent without flow limiting stenosis.   Persistent fetal origin left PCA with hypoplastic P1 segment. Mild to   moderate focal stenoses suspected in bilateral P2 segments.      CTA NECK:  GREAT VESSELS: Visualized segments are patent, no flow limiting stenosis.    COMMON CAROTID ARTERIES:  RIGHT CCA: Patent without flow limiting stenosis  LEFT CCA: Patent without flow limiting stenosis    CAROTID BULBS:  RIGHT CB: Patent without flow limiting stenosis  LEFT CB: Patent without flow limiting stenosis    INTERNAL CAROTID ARTERIES:  RIGHT ICA: Patent no evidence for any hemodynamically significant   stenosis at the ICA origin by NASCET criteria. Right ICA stent is patent.  LEFT ICA: Patent no evidence for any hemodynamically significant stenosis   at the ICA origin by NASCET criteria. Clips in the region of the left   carotid bulb and proximal ICA. No stent on the left side.    < end of copied text >  < from: CT Angio Brain Stroke Protocol  w/ IV Cont (06.18.23 @ 17:43) >    VERTEBRAL ARTERIES: Codominant.  RIGHT VA: Patent no evidence for any flow limiting stenosis.  LEFT VA: Patent no evidence for any flow limiting stenosis.      SOFT TISSUES: Unremarkable  BONES: Unremarkable      IMPRESSION:    CT PERFUSION demonstrated: No core infarct. No active ischemia.  If symptoms persist consider follow up head CT or MRI, MRA  if no   contraindication.    CTA COW:  Moderate focal stenosis at the origin of one of the M2 segments   on the left MCA.  Persistent fetal origin left PCA with hypoplastic P1 segment.  Mild to moderate focal stenoses suspected in bilateral P2 segments.    CTA NECK: Patent, ECAs, ICAs, no  hemodynamically significant stenosis at    ICA origins by NASCET criteria. Right ICA stent appears to be patent.   Evidence suggesting left sided endarterectomy.  Bilateral vertebral arteries are patent without flow limiting stenosis.     Discussed with Dr. Alvarez in the ED at 6:04 PM.    --- End of Report ---    < end of copied text >

## 2023-06-19 NOTE — CONSULT NOTE ADULT - SUBJECTIVE AND OBJECTIVE BOX
LUIS FERNANDO ALY  [id number]      HPI:    Luis Fernando Aly is a 62 year old man, cigarette smoker with past medical history of Hypertension, Carotid stenosis (s/p right carotid stent and left CEA) presents with transient episode of left arm numbness and left-sided facial drop.      ALLERGIES:  No Known Allergies      PAST MEDICAL & SURGICAL HISTORY:  Hypertension  Carotid stenosis s/p L CEA      CURRENT MEDICATIONS:  acetaminophen     Tablet .. 650 milliGRAM(s) Oral every 6 hours PRN  aluminum hydroxide/magnesium hydroxide/simethicone Suspension 30 milliLiter(s) Oral every 4 hours PRN  aspirin enteric coated 81 milliGRAM(s) Oral daily  atorvastatin 80 milliGRAM(s) Oral at bedtime  cloNIDine 0.1 milliGRAM(s) Oral daily  enoxaparin Injectable 40 milliGRAM(s) SubCutaneous every 24 hours  losartan 25 milliGRAM(s) Oral daily  melatonin 3 milliGRAM(s) Oral at bedtime PRN  metoprolol succinate  milliGRAM(s) Oral daily  ondansetron Injectable 4 milliGRAM(s) IV Push every 8 hours PRN  pantoprazole    Tablet 40 milliGRAM(s) Oral before breakfast        ROS:  All 10 systems reviewed and positives noted in HPI    OBJECTIVE:    VITAL SIGNS:  Vital Signs Last 24 Hrs  T(C): 36.8 (19 Jun 2023 13:56), Max: 36.9 (19 Jun 2023 05:19)  T(F): 98.2 (19 Jun 2023 13:56), Max: 98.4 (19 Jun 2023 05:19)  HR: 67 (19 Jun 2023 13:56) (63 - 74)  BP: 136/85 (19 Jun 2023 13:56) (136/85 - 163/87)  BP(mean): --  RR: 17 (19 Jun 2023 13:56) (16 - 19)  SpO2: 100% (19 Jun 2023 13:56) (96% - 100%)    Parameters below as of 19 Jun 2023 13:56  Patient On (Oxygen Delivery Method): room air        PHYSICAL EXAM:  General: well appearing, no distress  HEENT: sclera anicteric  Neck: supple, no carotid bruits b/l  CVS: JVP ~ 7 cm H20, RRR, s1, s2, no murmurs/rubs/gallops  Chest: unlabored respirations, clear to auscultation b/l  Abdomen: non-distended  Extremities: no lower extremity edema b/l  Neuro: awake, alert & oriented x 3  Psych: normal affect      LABS:                        13.1   6.60  )-----------( 189      ( 19 Jun 2023 07:35 )             37.5     06-19    137  |  101  |  13  ----------------------------<  105<H>  3.4<L>   |  29  |  0.93    Ca    9.0      19 Jun 2023 07:35    TPro  7.0  /  Alb  3.2<L>  /  TBili  0.4  /  DBili  x   /  AST  19  /  ALT  36  /  AlkPhos  74  06-19        PT/INR - ( 18 Jun 2023 17:29 )   PT: 13.7 sec;   INR: 1.18 ratio         PTT - ( 18 Jun 2023 17:29 )  PTT:31.5 sec      CT Head (6/18/23):  CT PERFUSION demonstrated: No core infarct. No active ischemia.  If symptoms persist consider follow up head CT or MRI, MRA  if no   contraindication.    CTA COW:  Moderate focal stenosis at the origin of one of the M2 segments on the left MCA.  Persistent fetal origin left PCA with hypoplastic P1 segment.  Mild to moderate focal stenoses suspected in bilateral P2 segments.    CTA NECK: Patent, ECAs, ICAs, no  hemodynamically significant stenosis at ICA origins by NASCET criteria. Right ICA stent appears to be patent.   Evidence suggesting left sided endarterectomy.  Bilateral vertebral arteries are patent without flow limiting stenosis.      TTE (6/19/23):   1. Moderately decreased global left ventricular systolic function.   2. Left ventricular ejection fraction, by visual estimation, is 40%.   3. Calculated LVEF by Simpsons Biplane Method 35%. Abnormal left   ventricle global longitudinal strain. Moderate global left ventricle   hypokinesis.   4. Increased LV wall thickness.   5. Normal left ventricular internal cavity size.   6. Normal right ventricular size and function.   7. The left atrium is normal in size.   8. The right atrium is normal in size.   9. Mild mitral valve leaflet thickening and calcification.  10. Mild mitral valve regurgitation.  11. Mild aortic valve leaflet calcification. No aortic valve stenosis.  12. There is mild aortic root calcification.  13. There is no evidence of pericardial effusion   LUIS FERNANDO ALY  [id number]     ID#: [id number]    HPI:    Luis Fernando Aly is a 62 year old man, cigarette smoker with past medical history of Hypertension, Carotid stenosis (s/p right carotid stent and left CEA) presents with transient episode of left arm numbness and left-sided facial drop.    The patient reports that he had a brief episode of left facial numbness and left arm weakness and so his son brought him to the ER. The patient reports that he follows with a cardiologist in [place] and was last evaluated by him about 1 year ago. Denies exertional angina or dyspnea. Denies palpitations or syncope.     ALLERGIES:  No Known Allergies      PAST MEDICAL & SURGICAL HISTORY:  Hypertension  Carotid stenosis s/p L CEA      CURRENT MEDICATIONS:  acetaminophen     Tablet .. 650 milliGRAM(s) Oral every 6 hours PRN  aluminum hydroxide/magnesium hydroxide/simethicone Suspension 30 milliLiter(s) Oral every 4 hours PRN  aspirin enteric coated 81 milliGRAM(s) Oral daily  atorvastatin 80 milliGRAM(s) Oral at bedtime  cloNIDine 0.1 milliGRAM(s) Oral daily  enoxaparin Injectable 40 milliGRAM(s) SubCutaneous every 24 hours  losartan 25 milliGRAM(s) Oral daily  melatonin 3 milliGRAM(s) Oral at bedtime PRN  metoprolol succinate  milliGRAM(s) Oral daily  ondansetron Injectable 4 milliGRAM(s) IV Push every 8 hours PRN  pantoprazole    Tablet 40 milliGRAM(s) Oral before breakfast        ROS:  All 10 systems reviewed and positives noted in HPI    OBJECTIVE:    VITAL SIGNS:  Vital Signs Last 24 Hrs  T(C): 36.8 (19 Jun 2023 13:56), Max: 36.9 (19 Jun 2023 05:19)  T(F): 98.2 (19 Jun 2023 13:56), Max: 98.4 (19 Jun 2023 05:19)  HR: 67 (19 Jun 2023 13:56) (63 - 74)  BP: 136/85 (19 Jun 2023 13:56) (136/85 - 163/87)  BP(mean): --  RR: 17 (19 Jun 2023 13:56) (16 - 19)  SpO2: 100% (19 Jun 2023 13:56) (96% - 100%)    Parameters below as of 19 Jun 2023 13:56  Patient On (Oxygen Delivery Method): room air        PHYSICAL EXAM:  General: no distress  HEENT: sclera anicteric  Neck: supple, no carotid bruits b/l  CVS: JVP ~ 7 cm H20, RRR, s1, s2, no murmurs/rubs/gallops  Chest: unlabored respirations, faint crackles   Abdomen: non-distended  Extremities: no lower extremity edema b/l  Neuro: awake, alert & oriented  Psych: normal affect      LABS:                        13.1   6.60  )-----------( 189      ( 19 Jun 2023 07:35 )             37.5     06-19    137  |  101  |  13  ----------------------------<  105<H>  3.4<L>   |  29  |  0.93    Ca    9.0      19 Jun 2023 07:35    TPro  7.0  /  Alb  3.2<L>  /  TBili  0.4  /  DBili  x   /  AST  19  /  ALT  36  /  AlkPhos  74  06-19        PT/INR - ( 18 Jun 2023 17:29 )   PT: 13.7 sec;   INR: 1.18 ratio         PTT - ( 18 Jun 2023 17:29 )  PTT:31.5 sec      CT Head (6/18/23):  CT PERFUSION demonstrated: No core infarct. No active ischemia.  If symptoms persist consider follow up head CT or MRI, MRA  if no   contraindication.    CTA COW:  Moderate focal stenosis at the origin of one of the M2 segments on the left MCA.  Persistent fetal origin left PCA with hypoplastic P1 segment.  Mild to moderate focal stenoses suspected in bilateral P2 segments.    CTA NECK: Patent, ECAs, ICAs, no  hemodynamically significant stenosis at ICA origins by NASCET criteria. Right ICA stent appears to be patent.   Evidence suggesting left sided endarterectomy.  Bilateral vertebral arteries are patent without flow limiting stenosis.      TTE (6/19/23):   1. Moderately decreased global left ventricular systolic function.   2. Left ventricular ejection fraction, by visual estimation, is 40%.   3. Calculated LVEF by Simpsons Biplane Method 35%. Abnormal left   ventricle global longitudinal strain. Moderate global left ventricle   hypokinesis.   4. Increased LV wall thickness.   5. Normal left ventricular internal cavity size.   6. Normal right ventricular size and function.   7. The left atrium is normal in size.   8. The right atrium is normal in size.   9. Mild mitral valve leaflet thickening and calcification.  10. Mild mitral valve regurgitation.  11. Mild aortic valve leaflet calcification. No aortic valve stenosis.  12. There is mild aortic root calcification.  13. There is no evidence of pericardial effusion    ECG (6/18/23): sinus rhythm, incomplete RBBB, lateral T wave inversions

## 2023-06-19 NOTE — OCCUPATIONAL THERAPY INITIAL EVALUATION ADULT - ADDITIONAL COMMENTS
per pt report, pt lives in 2 family home with 6+6 steps to unit. Pt reports being independent with all ADLs, driving, working. Spouse retired and able to assist as needed.

## 2023-06-19 NOTE — PROGRESS NOTE ADULT - SUBJECTIVE AND OBJECTIVE BOX
Patient is a 62y old  Male who presents with a chief complaint of TIA (2023 19:52)      INTERVAL HPI:  OVERNIGHT EVENTS:  T(F): 98.4 (23 @ 05:19), Max: 98.4 (23 @ 05:19)  HR: 63 (23 @ 05:19) (63 - 74)  BP: 144/83 (23 @ 05:19) (138/68 - 158/85)  RR: 17 (23 @ 05:19) (16 - 19)  SpO2: 97% (23 @ 05:19) (96% - 100%)  I&O's Summary    2023 07:01  -  2023 07:00  --------------------------------------------------------  IN: 1150 mL / OUT: 850 mL / NET: 300 mL          PHYSICAL EXAM:  GENERAL: NAD, well-groomed, well-developed  HEAD:  Atraumatic, Normocephalic  EYES: EOMI, PERRLA, conjunctiva and sclera clear  ENMT: No tonsillar erythema, exudates, or enlargement; Moist mucous membranes, Good dentition, No lesions  NECK: Supple, No JVD, Normal thyroid  NERVOUS SYSTEM:  Alert & Oriented X3, Good concentration; Motor Strength 5/5 B/L upper and lower extremities; DTRs 2+ intact and symmetric  CHEST/LUNG: Clear to percussion bilaterally; No rales, rhonchi, wheezing, or rubs  HEART: Regular rate and rhythm; No murmurs, rubs, or gallops  ABDOMEN: Soft, Nontender, Nondistended; Bowel sounds present  EXTREMITIES:  2+ Peripheral Pulses, No clubbing, cyanosis, or edema  LYMPH: No lymphadenopathy noted  SKIN: No rashes or lesions    LABS:                        13.6   7.61  )-----------( 235      ( 2023 17:29 )             38.7     06-18    132<L>  |  97  |  15  ----------------------------<  100<H>  3.7   |  27  |  0.87    Ca    8.8      2023 17:29    TPro  7.1  /  Alb  3.3  /  TBili  0.2  /  DBili  x   /  AST  24  /  ALT  39  /  AlkPhos  88  06-18    PT/INR - ( 2023 17:29 )   PT: 13.7 sec;   INR: 1.18 ratio         PTT - ( 2023 17:29 )  PTT:31.5 sec  Urinalysis Basic - ( 2023 19:00 )    Color: Yellow / Appearance: Clear / S.022 / pH: x  Gluc: x / Ketone: Negative mg/dL  / Bili: Negative / Urobili: 0.2 mg/dL   Blood: x / Protein: Negative mg/dL / Nitrite: Negative   Leuk Esterase: Negative / RBC: x / WBC x   Sq Epi: x / Non Sq Epi: x / Bacteria: x      CAPILLARY BLOOD GLUCOSE      POCT Blood Glucose.: 112 mg/dL (2023 17:23)              MEDICATIONS  (STANDING):  aspirin enteric coated 81 milliGRAM(s) Oral daily  atorvastatin 80 milliGRAM(s) Oral at bedtime  cloNIDine 0.1 milliGRAM(s) Oral daily  clopidogrel Tablet 75 milliGRAM(s) Oral daily  dextrose 5% + sodium chloride 0.45%. 1000 milliLiter(s) (75 mL/Hr) IV Continuous <Continuous>  enoxaparin Injectable 40 milliGRAM(s) SubCutaneous every 24 hours  losartan 25 milliGRAM(s) Oral daily  metoprolol succinate  milliGRAM(s) Oral daily  pantoprazole    Tablet 40 milliGRAM(s) Oral before breakfast    MEDICATIONS  (PRN):  acetaminophen     Tablet .. 650 milliGRAM(s) Oral every 6 hours PRN Temp greater or equal to 38C (100.4F), Mild Pain (1 - 3)  aluminum hydroxide/magnesium hydroxide/simethicone Suspension 30 milliLiter(s) Oral every 4 hours PRN Dyspepsia  melatonin 3 milliGRAM(s) Oral at bedtime PRN Insomnia  ondansetron Injectable 4 milliGRAM(s) IV Push every 8 hours PRN Nausea and/or Vomiting       Patient is a 62y old  Male who presents with a chief complaint of TIA (2023 19:52)      INTERVAL HPI: Pt seen and examined. States he is feeling better, numbness is improved, states he had some dental issues prior but now improved.     OVERNIGHT EVENTS: none noted  T(F): 98.4 (23 @ 05:19), Max: 98.4 (23 @ 05:19)  HR: 63 (23 @ 05:19) (63 - 74)  BP: 144/83 (23 @ 05:19) (138/68 - 158/85)  RR: 17 (23 @ 05:19) (16 - 19)  SpO2: 97% (23 @ 05:19) (96% - 100%)  I&O's Summary    2023 07:01  -  2023 07:00  --------------------------------------------------------  IN: 1150 mL / OUT: 850 mL / NET: 300 mL          PHYSICAL EXAM:  GENERAL- NAD  EAR/NOSE/MOUTH/THROAT -  MMM  EYES- WEI, conjunctiva and Sclera clear  NECK- supple  RESPIRATORY-  clear to auscultation bilaterally, non laboured breathing  CARDIOVASCULAR - SIS2, RRR  GI - soft NT BS present  EXTREMITIES- no pedal edema  NEUROLOGY- no gross focal deficits  SKIN- no rashes, warm to touch  PSYCHIATRY- AAO X 3  MUSCULOSKELETAL- ROM normal    LABS:                        13.6   7.61  )-----------( 235      ( 2023 17:29 )             38.7     06-18    132<L>  |  97  |  15  ----------------------------<  100<H>  3.7   |  27  |  0.87    Ca    8.8      2023 17:29    TPro  7.1  /  Alb  3.3  /  TBili  0.2  /  DBili  x   /  AST  24  /  ALT  39  /  AlkPhos  88  06-18    PT/INR - ( 2023 17:29 )   PT: 13.7 sec;   INR: 1.18 ratio         PTT - ( 2023 17:29 )  PTT:31.5 sec  Urinalysis Basic - ( 2023 19:00 )    Color: Yellow / Appearance: Clear / S.022 / pH: x  Gluc: x / Ketone: Negative mg/dL  / Bili: Negative / Urobili: 0.2 mg/dL   Blood: x / Protein: Negative mg/dL / Nitrite: Negative   Leuk Esterase: Negative / RBC: x / WBC x   Sq Epi: x / Non Sq Epi: x / Bacteria: x      CAPILLARY BLOOD GLUCOSE      POCT Blood Glucose.: 112 mg/dL (2023 17:23)              MEDICATIONS  (STANDING):  aspirin enteric coated 81 milliGRAM(s) Oral daily  atorvastatin 80 milliGRAM(s) Oral at bedtime  cloNIDine 0.1 milliGRAM(s) Oral daily  clopidogrel Tablet 75 milliGRAM(s) Oral daily  dextrose 5% + sodium chloride 0.45%. 1000 milliLiter(s) (75 mL/Hr) IV Continuous <Continuous>  enoxaparin Injectable 40 milliGRAM(s) SubCutaneous every 24 hours  losartan 25 milliGRAM(s) Oral daily  metoprolol succinate  milliGRAM(s) Oral daily  pantoprazole    Tablet 40 milliGRAM(s) Oral before breakfast    MEDICATIONS  (PRN):  acetaminophen     Tablet .. 650 milliGRAM(s) Oral every 6 hours PRN Temp greater or equal to 38C (100.4F), Mild Pain (1 - 3)  aluminum hydroxide/magnesium hydroxide/simethicone Suspension 30 milliLiter(s) Oral every 4 hours PRN Dyspepsia  melatonin 3 milliGRAM(s) Oral at bedtime PRN Insomnia  ondansetron Injectable 4 milliGRAM(s) IV Push every 8 hours PRN Nausea and/or Vomiting

## 2023-06-19 NOTE — OCCUPATIONAL THERAPY INITIAL EVALUATION ADULT - MD ORDER
MD orders reviewed. Chart reviewed, MD contacted and gave verbal order to discontinue bedrest orders; conferred with RN.

## 2023-06-19 NOTE — PHYSICAL THERAPY INITIAL EVALUATION ADULT - PERTINENT HX OF CURRENT PROBLEM, REHAB EVAL
62-year-old male with past medical history of smoking  hypertension and bilateral carotid stenosis s/p stent (R) and CEA (L)  presents to the ED for evaluation of neurologic symptoms.  He is accompanied by his son who contributes this history.  States that around 930 this morning, while getting out of his car, he experienced a transient episode of left arm numbness, as well as numbness and drooping in the left side of his face.  His symptoms resolved quickly, and recurred several hours later after which she came to the emergency department.  Son notes that he also had some transient facial drooping noted yesterday. He has otherwise been in his normal state of health

## 2023-06-19 NOTE — SWALLOW BEDSIDE ASSESSMENT ADULT - SPECIFY REASON(S)
To subjectively assess swallow function and determine overt diet texture tolerance versus need for diet texture downgrade.

## 2023-06-19 NOTE — SBIRT NOTE ADULT - NSSBIRTALCPOSREINDET_GEN_A_CORE
Patient denied having an addiction to alcohol.  Patient stated that he drinks socially.  SW encouraged patient to reduce alcohol intake to prevent further health issues.

## 2023-06-19 NOTE — OCCUPATIONAL THERAPY INITIAL EVALUATION ADULT - GENERAL OBSERVATIONS, REHAB EVAL
Pt received semi-fowlers in bed +PIV, +cardiac monitor, +pulse oximetry monitor, pt tolerated OT initial evaluation, treatment plan and goals discussed and agreed upon. Pt left sitting in bedside chair with RN notified and in no apparent distress, VSS, all lines intact, and call bell in reach.

## 2023-06-19 NOTE — SWALLOW BEDSIDE ASSESSMENT ADULT - SLP GENERAL OBSERVATIONS
Patient received in chair, awake & alert, oriented x4. Utilized  throughout this encounter. Vitals monitored throughout with baseline: 62HR, 99% SpO2 on RA, 19 RR and post-evaluation vitals: 67HR, 100% SpO2, 18 RR. BP taken by PCA at end of session notably hypertensive 136/120 (PCA verbalized to RN)

## 2023-06-20 ENCOUNTER — TRANSCRIPTION ENCOUNTER (OUTPATIENT)
Age: 63
End: 2023-06-20

## 2023-06-20 ENCOUNTER — APPOINTMENT (OUTPATIENT)
Dept: MRI IMAGING | Facility: HOSPITAL | Age: 63
End: 2023-06-20

## 2023-06-20 VITALS — DIASTOLIC BLOOD PRESSURE: 86 MMHG | HEART RATE: 62 BPM | SYSTOLIC BLOOD PRESSURE: 161 MMHG

## 2023-06-20 PROBLEM — Z86.79 PERSONAL HISTORY OF OTHER DISEASES OF THE CIRCULATORY SYSTEM: Chronic | Status: ACTIVE | Noted: 2023-06-18

## 2023-06-20 LAB
ALBUMIN SERPL ELPH-MCNC: 3.2 G/DL — LOW (ref 3.3–5)
ALP SERPL-CCNC: 81 U/L — SIGNIFICANT CHANGE UP (ref 40–120)
ALT FLD-CCNC: 36 U/L — SIGNIFICANT CHANGE UP (ref 10–45)
ANION GAP SERPL CALC-SCNC: 5 MMOL/L — SIGNIFICANT CHANGE UP (ref 5–17)
AST SERPL-CCNC: 20 U/L — SIGNIFICANT CHANGE UP (ref 10–40)
BASOPHILS # BLD AUTO: 0.07 K/UL — SIGNIFICANT CHANGE UP (ref 0–0.2)
BASOPHILS NFR BLD AUTO: 1.1 % — SIGNIFICANT CHANGE UP (ref 0–2)
BILIRUB SERPL-MCNC: 0.3 MG/DL — SIGNIFICANT CHANGE UP (ref 0.2–1.2)
BUN SERPL-MCNC: 10 MG/DL — SIGNIFICANT CHANGE UP (ref 7–23)
CALCIUM SERPL-MCNC: 8.9 MG/DL — SIGNIFICANT CHANGE UP (ref 8.4–10.5)
CHLORIDE SERPL-SCNC: 101 MMOL/L — SIGNIFICANT CHANGE UP (ref 96–108)
CO2 SERPL-SCNC: 32 MMOL/L — HIGH (ref 22–31)
CREAT SERPL-MCNC: 1.03 MG/DL — SIGNIFICANT CHANGE UP (ref 0.5–1.3)
EGFR: 82 ML/MIN/1.73M2 — SIGNIFICANT CHANGE UP
EOSINOPHIL # BLD AUTO: 0.32 K/UL — SIGNIFICANT CHANGE UP (ref 0–0.5)
EOSINOPHIL NFR BLD AUTO: 5.2 % — SIGNIFICANT CHANGE UP (ref 0–6)
GLUCOSE SERPL-MCNC: 98 MG/DL — SIGNIFICANT CHANGE UP (ref 70–99)
HCT VFR BLD CALC: 37.9 % — LOW (ref 39–50)
HGB BLD-MCNC: 13.2 G/DL — SIGNIFICANT CHANGE UP (ref 13–17)
IMM GRANULOCYTES NFR BLD AUTO: 0.3 % — SIGNIFICANT CHANGE UP (ref 0–0.9)
LYMPHOCYTES # BLD AUTO: 0.81 K/UL — LOW (ref 1–3.3)
LYMPHOCYTES # BLD AUTO: 13.2 % — SIGNIFICANT CHANGE UP (ref 13–44)
MCHC RBC-ENTMCNC: 31.4 PG — SIGNIFICANT CHANGE UP (ref 27–34)
MCHC RBC-ENTMCNC: 34.8 GM/DL — SIGNIFICANT CHANGE UP (ref 32–36)
MCV RBC AUTO: 90 FL — SIGNIFICANT CHANGE UP (ref 80–100)
MONOCYTES # BLD AUTO: 0.88 K/UL — SIGNIFICANT CHANGE UP (ref 0–0.9)
MONOCYTES NFR BLD AUTO: 14.4 % — HIGH (ref 2–14)
NEUTROPHILS # BLD AUTO: 4.03 K/UL — SIGNIFICANT CHANGE UP (ref 1.8–7.4)
NEUTROPHILS NFR BLD AUTO: 65.8 % — SIGNIFICANT CHANGE UP (ref 43–77)
NRBC # BLD: 0 /100 WBCS — SIGNIFICANT CHANGE UP (ref 0–0)
PLATELET # BLD AUTO: 198 K/UL — SIGNIFICANT CHANGE UP (ref 150–400)
POTASSIUM SERPL-MCNC: 3.9 MMOL/L — SIGNIFICANT CHANGE UP (ref 3.5–5.3)
POTASSIUM SERPL-SCNC: 3.9 MMOL/L — SIGNIFICANT CHANGE UP (ref 3.5–5.3)
PROT SERPL-MCNC: 7 G/DL — SIGNIFICANT CHANGE UP (ref 6–8.3)
RBC # BLD: 4.21 M/UL — SIGNIFICANT CHANGE UP (ref 4.2–5.8)
RBC # FLD: 11.8 % — SIGNIFICANT CHANGE UP (ref 10.3–14.5)
SODIUM SERPL-SCNC: 138 MMOL/L — SIGNIFICANT CHANGE UP (ref 135–145)
WBC # BLD: 6.13 K/UL — SIGNIFICANT CHANGE UP (ref 3.8–10.5)
WBC # FLD AUTO: 6.13 K/UL — SIGNIFICANT CHANGE UP (ref 3.8–10.5)

## 2023-06-20 PROCEDURE — 84484 ASSAY OF TROPONIN QUANT: CPT

## 2023-06-20 PROCEDURE — 83036 HEMOGLOBIN GLYCOSYLATED A1C: CPT

## 2023-06-20 PROCEDURE — 86803 HEPATITIS C AB TEST: CPT

## 2023-06-20 PROCEDURE — 99291 CRITICAL CARE FIRST HOUR: CPT | Mod: 25

## 2023-06-20 PROCEDURE — 70496 CT ANGIOGRAPHY HEAD: CPT | Mod: MA

## 2023-06-20 PROCEDURE — 80053 COMPREHEN METABOLIC PANEL: CPT

## 2023-06-20 PROCEDURE — 85610 PROTHROMBIN TIME: CPT

## 2023-06-20 PROCEDURE — 97166 OT EVAL MOD COMPLEX 45 MIN: CPT

## 2023-06-20 PROCEDURE — 92610 EVALUATE SWALLOWING FUNCTION: CPT

## 2023-06-20 PROCEDURE — 85730 THROMBOPLASTIN TIME PARTIAL: CPT

## 2023-06-20 PROCEDURE — 82962 GLUCOSE BLOOD TEST: CPT

## 2023-06-20 PROCEDURE — 36415 COLL VENOUS BLD VENIPUNCTURE: CPT

## 2023-06-20 PROCEDURE — 85025 COMPLETE CBC W/AUTO DIFF WBC: CPT

## 2023-06-20 PROCEDURE — 71045 X-RAY EXAM CHEST 1 VIEW: CPT

## 2023-06-20 PROCEDURE — 93880 EXTRACRANIAL BILAT STUDY: CPT

## 2023-06-20 PROCEDURE — 82746 ASSAY OF FOLIC ACID SERUM: CPT

## 2023-06-20 PROCEDURE — 70551 MRI BRAIN STEM W/O DYE: CPT | Mod: 26

## 2023-06-20 PROCEDURE — 99232 SBSQ HOSP IP/OBS MODERATE 35: CPT

## 2023-06-20 PROCEDURE — 0042T: CPT | Mod: MA

## 2023-06-20 PROCEDURE — 70498 CT ANGIOGRAPHY NECK: CPT | Mod: MA

## 2023-06-20 PROCEDURE — 93306 TTE W/DOPPLER COMPLETE: CPT

## 2023-06-20 PROCEDURE — 70551 MRI BRAIN STEM W/O DYE: CPT

## 2023-06-20 PROCEDURE — 82607 VITAMIN B-12: CPT

## 2023-06-20 PROCEDURE — 70450 CT HEAD/BRAIN W/O DYE: CPT | Mod: MA

## 2023-06-20 PROCEDURE — 80061 LIPID PANEL: CPT

## 2023-06-20 PROCEDURE — 97161 PT EVAL LOW COMPLEX 20 MIN: CPT

## 2023-06-20 PROCEDURE — 93005 ELECTROCARDIOGRAM TRACING: CPT

## 2023-06-20 PROCEDURE — 84443 ASSAY THYROID STIM HORMONE: CPT

## 2023-06-20 RX ADMIN — PANTOPRAZOLE SODIUM 40 MILLIGRAM(S): 20 TABLET, DELAYED RELEASE ORAL at 05:32

## 2023-06-20 RX ADMIN — Medication 81 MILLIGRAM(S): at 11:19

## 2023-06-20 RX ADMIN — LOSARTAN POTASSIUM 25 MILLIGRAM(S): 100 TABLET, FILM COATED ORAL at 05:32

## 2023-06-20 RX ADMIN — Medication 100 MILLIGRAM(S): at 05:32

## 2023-06-20 RX ADMIN — Medication 0.1 MILLIGRAM(S): at 05:32

## 2023-06-20 NOTE — DISCHARGE NOTE PROVIDER - NSDCFUSCHEDAPPT_GEN_ALL_CORE_FT
[facility] Physician [facility]  MRIIP OP [address] [facility]  Scheduled Appointment: 06/20/2023

## 2023-06-20 NOTE — PROGRESS NOTE ADULT - SUBJECTIVE AND OBJECTIVE BOX
Patient is a 62y old  Male who presents with a chief complaint of TIA/cardiomyopathy (2023 10:23)      Patient seen and examined at bedside. Pt states they feel well, denies overnight events or current complaints including chest pain, shortness of breath, dizziness, nausea, vomiting, diarrhea, fever or chills.      ALLERGIES:  No Known Allergies    MEDICATIONS  (STANDING):  aspirin enteric coated 81 milliGRAM(s) Oral daily  atorvastatin 80 milliGRAM(s) Oral at bedtime  cloNIDine 0.1 milliGRAM(s) Oral daily  enoxaparin Injectable 40 milliGRAM(s) SubCutaneous every 24 hours  losartan 25 milliGRAM(s) Oral daily  metoprolol succinate  milliGRAM(s) Oral daily  pantoprazole    Tablet 40 milliGRAM(s) Oral before breakfast    MEDICATIONS  (PRN):  acetaminophen     Tablet .. 650 milliGRAM(s) Oral every 6 hours PRN Temp greater or equal to 38C (100.4F), Mild Pain (1 - 3)  aluminum hydroxide/magnesium hydroxide/simethicone Suspension 30 milliLiter(s) Oral every 4 hours PRN Dyspepsia  melatonin 3 milliGRAM(s) Oral at bedtime PRN Insomnia  ondansetron Injectable 4 milliGRAM(s) IV Push every 8 hours PRN Nausea and/or Vomiting    Vital Signs Last 24 Hrs  T(F): 98.2 (2023 05:13), Max: 99.2 (2023 19:54)  HR: 70 (2023 05:13) (67 - 70)  BP: 159/81 (2023 05:13) (136/85 - 186/85)  RR: 16 (2023 05:13) (16 - 17)  SpO2: 98% (2023 05:13) (98% - 100%)  I&O's Summary    2023 07:01  -  2023 07:00  --------------------------------------------------------  IN: 0 mL / OUT: 800 mL / NET: -800 mL      PHYSICAL EXAM:  General: NAD, A/O x 3  ENT: MMM, no oral thrush   Neck: Supple, No JVD  Lungs: Clear to auscultation bilaterally, non labored breathing  Cardio: RRR, S1/S2, No murmurs  Abdomen: Soft, Nontender, Nondistended; Bowel sounds present  Extremities: No calf tenderness, No pitting edema    LABS:                        13.2   6.13  )-----------( 198      ( 2023 05:56 )             37.9     06-20    138  |  101  |  10  ----------------------------<  98  3.9   |  32  |  1.03    Ca    8.9      2023 05:56    TPro  7.0  /  Alb  3.2  /  TBili  0.3  /  DBili  x   /  AST  20  /  ALT  36  /  AlkPhos  81  06-20      PT/INR - ( 2023 17:29 )   PT: 13.7 sec;   INR: 1.18 ratio         PTT - ( 2023 17:29 )  PTT:31.5 sec    CARDIAC MARKERS ( 2023 19:28 )  x     / 19.1 ng/L / x     / x     / x      CARDIAC MARKERS ( 2023 17:29 )  x     / 20.0 ng/L / x     / x     / x          06-19 Chol 148 mg/dL LDL -- HDL 42 mg/dL Trig 146 mg/dL  TSH 2.932   TSH with FT4 reflex --  Total T3 --                  Urinalysis Basic - ( 2023 19:00 )    Color: Yellow / Appearance: Clear / S.022 / pH: x  Gluc: x / Ketone: Negative mg/dL  / Bili: Negative / Urobili: 0.2 mg/dL   Blood: x / Protein: Negative mg/dL / Nitrite: Negative   Leuk Esterase: Negative / RBC: x / WBC x   Sq Epi: x / Non Sq Epi: x / Bacteria: x            RADIOLOGY & ADDITIONAL TESTS:  < from: US Duplex Carotid Arteries Complete, Bilateral (23 @ 16:08) >    IMPRESSION: No significant hemodynamic stenosis of either carotid artery.   Patent right ICA stent.  Elevated peak systolic velocities of the vertebral arteries bilaterally,   etiology undetermined.    Measurement of carotid stenosis is based on velocity parameters that   correlate the residual internal carotid diameter with that of the more   distal vessel in accordance with a method such as the North American   Symptomatic Carotid Endarterectomy Trial (NASCET).    --- End of Report ---            RASHAUN DANIEL MD; Attending Radiologist  This document has been electronically signed. 2023  5:02PM    < end of copied text >      < from: TTE Echo Complete w/o Contrast w/ Doppler (23 @ 08:00) >  Summary:   1. Moderately decreased global left ventricular systolic function.   2. Left ventricular ejection fraction, by visual estimation, is 40%.   3. Calculated LVEF by Simpsons Biplane Method 35%. Abnormal left   ventricle global longitudinal strain. Moderate global left ventricle   hypokinesis.   4. Increased LV wall thickness.   5. Normal left ventricular internal cavity size.   6. Normal right ventricular size and function.   7. Theleft atrium is normal in size.   8. The right atrium is normal in size.   9. Mild mitral valve leaflet thickening and calcification.  10. Mild mitral valve regurgitation.  11. Mild aortic valve leaflet calcification. No aortic valve stenosis.  12. There is mild aortic root calcification.  13. There is no evidence of pericardial effusion.    [id number] Cornell Ba MD Electronically signed on   2023 at 12:08:15 PM            *** Final ***    < end of copied text >    Care Discussed with Consultants/Other Providers:   Cardiology Dr. Ba

## 2023-06-20 NOTE — DISCHARGE NOTE NURSING/CASE MANAGEMENT/SOCIAL WORK - NSDCVIVACCINE_GEN_ALL_CORE_FT
Tdap; 21-Oct-2021 12:01; Meeta Mann (RN); [employer]; [id number] (Exp. Date: 29-Jul-2023); IntraMuscular; Deltoid Left.; 0.5 milliLiter(s); VIS (VIS Published: 09-May-2013, VIS Presented: 21-Oct-2021);

## 2023-06-20 NOTE — PROGRESS NOTE ADULT - SUBJECTIVE AND OBJECTIVE BOX
Patient is a 62 year old man admitted 6/18/23 with transient left face, arm, and leg numbness. The patient states yesterday he was driving his car and suddenly felt numbness left upper lip, left arm and left leg which lasted a few seconds.  He denies HA or other associated neurological complaints. He denies fever,chest pain, or trauma. He was later in the day brought by family members to the hospital. In prior notes the son was reported as noting left facial droop also. Telestroke was consulted and advised continue ASA.  Presently, he denies any complaints. Today, Tuesday, he continues to deny any neurological complaints.    PMH: HTN          S/P Right Carotid stent 10 years ago'          S/P Left Carotid endarterectomy    SH: No allergy    Exam: Awake, alert, appropriate, speaks to this physician in [place] as only knows a few words in English           Pupils 2.5mm, EOM intact, no nystagmus, VFF           CN II-XII intact           Motor tone and strength RUE  5/5        LUE 5/5                                                RLE  5/5          LLE 5/5         Sensation intact                          13.1   6.60  )-----------( 189      ( 19 Jun 2023 07:35 )             37.5   06-19    137  |  101  |  13  ----------------------------<  105<H>  3.4<L>   |  29  |  0.93    Ca    9.0      19 Jun 2023 07:35    TPro  7.0  /  Alb  3.2<L>  /  TBili  0.4  /  DBili  x   /  AST  19  /  ALT  36  /  AlkPhos  74  06-19    Lipid Profile (06.19.23 @ 07:35)    Cholesterol, Serum: 148 mg/dL   Triglycerides, Serum: 146 mg/dL   HDL Cholesterol, Serum: 42 mg/dL   Non HDL Cholesterol: 106: Patients Atherosclerotic Cardiovascular Disease (ASCVD) Risk  Optimal Level (mg/dL)  LDL Cholesterol (LDL-C)  All Patients                                < 100  ASCVD at Very High Risk1    < 70  Non-HDL Cholesterol (Non-HDL-C)  All Patients                       < 130  ASCVD at Very High Risk1   < 100  Non-HDL-Cholesterol (Non-HDL-C) is also a key target for cardiovascular  risk reduction.  Consider Familial Hypercholesterolemia when: LDL-C > 190 mg/dL or  Non-HDL-C > 220 mg/dL.  LDL-C calculation using the Friedewald equation is not provided when  triglycerides > 400 mg/dL, in which case we recommend repeating the test  after fasting, if it was not done before.  When triglycerides >150 mg/dL, calculated LDL-C is provided but maystill  be inaccurate (particularly when LDL-C < 70 mg/dL). It can be  recalculated off-line using other equations (e.g. Devin SS, Armen LOVELACE,  Sapna VENTURA, et al.EDSON 2013;310:2061 - 8).  1 Alok Patel.,et al. "2019 AHA/ACC. . . guideline on the  management of blood cholesterol: a report of the American College of  Cardiology/American Heart Association Task Force on Clinical Practice  Guidelines." Circulation;139:e1082 - e1143.  These values apply only to persons 20 years and older.  Lipid Panel updated with new test, reference ranges and interpretive  comments effective 10-. mg/dL   LDL Cholesterol Calculated: 77 mg/dL    < from: TTE Echo Complete w/o Contrast w/ Doppler (06.19.23 @ 08:00) >    Summary:   1. Moderately decreased global left ventricular systolic function.   2. Left ventricular ejection fraction, by visual estimation, is 40%.   3. Calculated LVEF by Simpsons Biplane Method 35%. Abnormal left   ventricle global longitudinal strain. Moderate global left ventricle   hypokinesis.   4. Increased LV wall thickness.   5. Normal left ventricular internal cavity size.   6. Normal right ventricular size and function.   7. Theleft atrium is normal in size.   8. The right atrium is normal in size.   9. Mild mitral valve leaflet thickening and calcification.  10. Mild mitral valve regurgitation.  11. Mild aortic valve leaflet calcification. No aortic valve stenosis.  12. There is mild aortic root calcification.  13. There is no evidence of pericardial effusion.    [id number] Cornell Ba MD Electronically signed on   6/19/2023 at 12:08:15 PM    < end of copied text >      < from: US Duplex Carotid Arteries Complete, Bilateral (06.19.23 @ 16:08) >  tid arteries was performed.    FINDINGS:    No elevated velocities or abnormal waveforms are encountered. Mild right   and a mild to moderate left-sided atherosclerotic change. The right ICA   stent.    Peak systolic velocities are as follows:    RIGHT:  PROX CCA = 41 cm/s  DIST CCA = 45 cm/s  PROX ICA = 96 cm/s  DIST ICA = 77 cm/s  ECA = 133 cm/s    LEFT:  PROX CCA = 38 cm/s  DIST CCA = 53 cm/s  PROX ICA = 65 cm/s  DIST ICA = 113 cm/s  ECA = 63 cm/s    Antegrade flow is noted within both vertebral arteries. Peak systolic   velocity of the right vertebral artery is 1 21 cm/s and of the left   vertebral artery 92 cm/s.    IMPRESSION: No significant hemodynamic stenosis of either carotid artery.   Patent right ICA stent.  Elevated peak systolic velocities of the vertebral arteries bilaterally,   etiology undetermined.    Measurement of carotid stenosis is based on velocity parameters that   correlate the residual internal carotid diameter with that of the more   distal vessel in accordance with a method such as the North American   Symptomatic Carotid Endarterectomy Trial (NASCET).    --- End of Report ---      < from: MR Head No Cont (06.20.23 @ 14:27) >      INTERPRETATION:  Clinical indication: Face hand and leg numbness    MRI of the brain was performed sagittal T1 axial T1 T2 T2 FLAIR diffusion   and susceptibility sequence.    Parenchymal volume loss and chronic microvascular ischemic changes   identified    There is no acute hemorrhage mass or mass effect seen    Evaluation of the diffusion weighted sequence demonstrates no abnormal   areas of restricted diffusion to suggest acute infarct.    No abnormal intra or extra-axial collection seen.    The large vessels demonstrate normal flow-voids.    Bilateral ethmoid sinus mucosal thickening is seen    Mild inflammatory changes involving the left mastoid region.    IMPRESSION: No acute hemorrhage mass or mass effect.    --- End of Report ---            CHASE LOZADA MD; Attending Radiologist  This document has been electronically signed. Jun 20 2023  2:49PM    < end of copied text >          < from: CT Brain Stroke Protocol (06.18.23 @ 17:41) >    COMPARISON EXAMINATION: None.    FINDINGS:  HEAD CT:  VENTRICLES AND SULCI: Ventricles and sulci are unremarkable for patient   age.  INTRA-AXIAL: No intracranial mass, acute hemorrhage, or midline shift is   present.  EXTRA-AXIAL: No extra-axial fluid collection is present.  INTRACRANIAL HEMORRHAGE: None.    VISUALIZED SINUSES: No air-fluid levels are identified.  VISUALIZED MASTOIDS:  Clear.  CALVARIUM:  Intact.  MISCELLANEOUS:  None.    SOFT TISSUES: Unremarkable.  BONES: Unremarkable.      IMPRESSION:  HEAD CT: No acute hemorrhage or midline shift.    Discussed with Dr. Alvarez in the ED at 5:47 PM.    --- End of Report ---    < from: CT Angio Brain Stroke Protocol  w/ IV Cont (06.18.23 @ 17:43) >    CTA [place] OF [place]:  After the intravenous power injection of non-ionic contrast material,   serial thin sections were obtained through the intracranial circulation   on a multislice CT scanner.  Images were reformatted using a dedicated 3D   software package and viewed on a dedicated workstation inmultiple planes.    CTA NECK:  After the intravenous power injection of non-ionic contrast material,   serial thin sections were obtained through the cervical circulation on a   multislice CT scanner.  Images were reformatted using a dedicated 3D   software package and viewed on a dedicated workstation in multiple planes.    COMPARISON EXAMINATION: None.    FINDINGS:    CT RAPID PERFUSION:  CBF<30% volume: 0 ml  Tmax>6.0 s volume: 0 ml  Mismatch volume: 0 ml  Mismatch ratio: none    CORE INFARCT:None.  TISSUE AT RISK: None.  MISMATCH RATIO: None.      CTA [place]:  ANTERIOR CIRCULATION    < end of copied text >  < from: CT Angio Brain Stroke Protocol  w/ IV Cont (06.18.23 @ 17:43) >  NOID, BIFURCATION SEGMENTS: Patent without flow   limiting stenosis.    ANTERIOR CEREBRAL ARTERIES: Bilateral A1, anterior communicating and A2   anterior cerebral arteries are unremarkable in course and caliber without   flow limiting stenosis.    MIDDLE CEREBRAL ARTERIES: Patent right M1, M2, and distal MCA branches   without flow limiting stenosis. Moderate focal stenosis at the origin of   one of the M2 segments on the left MCA.      POSTERIOR CIRCULATION:  VERTEBRAL ARTERIES: Patent without flow limiting stenosis.  BASILAR ARTERY: Patent no flow limiting stenosis.  POSTERIOR CEREBRAL ARTERIES: Patent without flow limiting stenosis.   Persistent fetal origin left PCA with hypoplastic P1 segment. Mild to   moderate focal stenoses suspected in bilateral P2 segments.      CTA NECK:  GREAT VESSELS: Visualized segments are patent, no flow limiting stenosis.    COMMON CAROTID ARTERIES:  RIGHT CCA: Patent without flow limiting stenosis  LEFT CCA: Patent without flow limiting stenosis    CAROTID BULBS:  RIGHT CB: Patent without flow limiting stenosis  LEFT CB: Patent without flow limiting stenosis    INTERNAL CAROTID ARTERIES:  RIGHT ICA: Patent no evidence for any hemodynamically significant   stenosis at the ICA origin by NASCET criteria. Right ICA stent is patent.  LEFT ICA: Patent no evidence for any hemodynamically significant stenosis   at the ICA origin by NASCET criteria. Clips in the region of the left   carotid bulb and proximal ICA. No stent on the left side.    < end of copied text >  < from: CT Angio Brain Stroke Protocol  w/ IV Cont (06.18.23 @ 17:43) >    VERTEBRAL ARTERIES: Codominant.  RIGHT VA: Patent no evidence for any flow limiting stenosis.  LEFT VA: Patent no evidence for any flow limiting stenosis.      SOFT TISSUES: Unremarkable  BONES: Unremarkable      IMPRESSION:    CT PERFUSION demonstrated: No core infarct. No active ischemia.  If symptoms persist consider follow up head CT or MRI, MRA  if no   contraindication.    CTA COW:  Moderate focal stenosis at the origin of one of the M2 segments   on the left MCA.  Persistent fetal origin left PCA with hypoplastic P1 segment.  Mild to moderate focal stenoses suspected in bilateral P2 segments.    CTA NECK: Patent, ECAs, ICAs, no  hemodynamically significant stenosis at    ICA origins by NASCET criteria. Right ICA stent appears to be patent.   Evidence suggesting left sided endarterectomy.  Bilateral vertebral arteries are patent without flow limiting stenosis.     Discussed with Dr. Alvarez in the ED at 6:04 PM.    --- End of Report ---    < end of copied text >

## 2023-06-20 NOTE — PROGRESS NOTE ADULT - SUBJECTIVE AND OBJECTIVE BOX
BENNY ZIEGLER  [id number]     ID#: [id number]    Chief Complaint: TIA/Cardiomyopathy     Interval History: The patient denies chest pain or shortness of breath.     Tele: sinus rhythm 70s BPM      Current meds:   acetaminophen     Tablet .. 650 milliGRAM(s) Oral every 6 hours PRN  aluminum hydroxide/magnesium hydroxide/simethicone Suspension 30 milliLiter(s) Oral every 4 hours PRN  aspirin enteric coated 81 milliGRAM(s) Oral daily  atorvastatin 80 milliGRAM(s) Oral at bedtime  cloNIDine 0.1 milliGRAM(s) Oral daily  enoxaparin Injectable 40 milliGRAM(s) SubCutaneous every 24 hours  losartan 25 milliGRAM(s) Oral daily  melatonin 3 milliGRAM(s) Oral at bedtime PRN  metoprolol succinate  milliGRAM(s) Oral daily  ondansetron Injectable 4 milliGRAM(s) IV Push every 8 hours PRN  pantoprazole    Tablet 40 milliGRAM(s) Oral before breakfast      Objective:     Vital Signs:   T(C): 36.8 (06-20-23 @ 05:13), Max: 37.3 (06-19-23 @ 19:54)  HR: 70 (06-20-23 @ 05:13) (67 - 70)  BP: 159/81 (06-20-23 @ 05:13) (136/85 - 186/85)  RR: 16 (06-20-23 @ 05:13) (16 - 17)  SpO2: 98% (06-20-23 @ 05:13) (98% - 100%)      Physical Exam:   General: no distress  HEENT: sclera anicteric  Neck: supple, no carotid bruits b/l  CVS: JVP ~ 7 cm H20, RRR, s1, s2, no murmurs/rubs/gallops  Chest: unlabored respirations, faint crackles   Abdomen: non-distended  Extremities: no lower extremity edema b/l  Neuro: awake, alert & oriented  Psych: normal affect0      Labs:   20 Jun 2023 05:56    138    |  101    |  10     ----------------------------<  98     3.9     |  32     |  1.03     Ca    8.9        20 Jun 2023 05:56    TPro  7.0    /  Alb  3.2    /  TBili  0.3    /  DBili  x      /  AST  20     /  ALT  36     /  AlkPhos  81     20 Jun 2023 05:56                          13.2   6.13  )-----------( 198      ( 20 Jun 2023 05:56 )             37.9     PT/INR - ( 18 Jun 2023 17:29 )   PT: 13.7 sec;   INR: 1.18 ratio         PTT - ( 18 Jun 2023 17:29 )  PTT:31.5 sec        CT Head (6/18/23):  CT PERFUSION demonstrated: No core infarct. No active ischemia.  If symptoms persist consider follow up head CT or MRI, MRA  if no   contraindication.    CTA COW:  Moderate focal stenosis at the origin of one of the M2 segments on the left MCA.  Persistent fetal origin left PCA with hypoplastic P1 segment.  Mild to moderate focal stenoses suspected in bilateral P2 segments.    CTA NECK: Patent, ECAs, ICAs, no  hemodynamically significant stenosis at ICA origins by NASCET criteria. Right ICA stent appears to be patent.   Evidence suggesting left sided endarterectomy.  Bilateral vertebral arteries are patent without flow limiting stenosis.      TTE (6/19/23):   1. Moderately decreased global left ventricular systolic function.   2. Left ventricular ejection fraction, by visual estimation, is 40%.   3. Calculated LVEF by Simpsons Biplane Method 35%. Abnormal left   ventricle global longitudinal strain. Moderate global left ventricle   hypokinesis.   4. Increased LV wall thickness.   5. Normal left ventricular internal cavity size.   6. Normal right ventricular size and function.   7. The left atrium is normal in size.   8. The right atrium is normal in size.   9. Mild mitral valve leaflet thickening and calcification.  10. Mild mitral valve regurgitation.  11. Mild aortic valve leaflet calcification. No aortic valve stenosis.  12. There is mild aortic root calcification.  13. There is no evidence of pericardial effusion    ECG (6/18/23): sinus rhythm, incomplete RBBB, lateral T wave inversions

## 2023-06-20 NOTE — DISCHARGE NOTE PROVIDER - CARE PROVIDER_API CALL
Brett Chong.  Internal Medicine  [address] [place] [address]  Phone: [phone]  Fax: [phone]  Established Patient  Follow Up Time:

## 2023-06-20 NOTE — PROGRESS NOTE ADULT - ASSESSMENT
Assessment:  Luis Fernando Aly is a 62 year old man, cigarette smoker with past medical history of Hypertension, Carotid stenosis (s/p right carotid stent and left CEA) presents with transient episode of left arm numbness and left-sided facial drop, concerning for TIA.    Cardiology consulted due to abnormal echocardiogram findings with LVEF ~40% with moderate global left ventricle hypokinesis and abnormal LV strain. Telemetry reviewed and consistent with normal sinus rhythm and lateral T wave inversions, troponins not elevated, does not appear to be an acute coronary syndrome. CT head consistent with left MCA moderate focal stenosis, patent right ICA stent.     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 Patient reports remote history of unremarkable coronary angiogram, will need repeat ischemic evaluation, however patient states that he does not want to stay inpatient for further workup and wants to follow up with his cardiologist, discussed risks of myocardial infarction/death without having an ischemic evaluation and patient continues to decline ischemic workup. Continue Metoprolol succinate, consider starting Entresto.   [] Left arm numbness and facial droop: Symptoms resolved, follow up Neurology, plan for MRI head. Patient receiving Aspirin, Atorvastatin     We will continue to follow along.    Cornell Ba MD  Cardiology       
    Patient is a 62 year old man admitted 6/18/23 with transient left face, arm, and leg numbness. The patient states yesterday he was driving his car and suddenly felt numbness left upper lip, left arm and left leg which lasted a few seconds.  He denies HA or other associated neurological complaints. He denies fever,chest pain, or trauma. He was later in the day brought by family members to the hospital. In prior notes the son was reported as noting left facial droop also. Telestroke was consulted and advised continue ASA.  Presently, he denies any complaints. Neurological exam as above. Would be concerned with TIA.    1) CT head as above no cute hemorrhage  2) CT Perfusion as above no core infarct. No active ischemia  3) CTA COW as above moderate focal stenosis origin of M2 segment Left MCA. mild to moderate focal stenoses suspected bilateral P2 segments  4) CTA Neck as above no hemodynamically significant stenosis at ICA origins. Right ICA stent patent. Evidence suggesting left sided endarterectomy. Bilateral vertebral arteries patient  5) Echocardiogram as above no thrombus  6) Carotid Duplex as above no significant stenosis of either carotid artery. Patent right ICA stent  7) MRI Head as above no areas of restricted diffusion to suggest acute infarct.  8) Continue ASA 81 mg daily  
62-year-old male with past medical history of smoking  hypertension and bilateral carotid stenosis s/p stent (R) and CEA (L)  presents to the ED for evaluation of neurologic symptoms.  He is accompanied by his son who contributes this history.  States that around 930 this morning, while getting out of his car, he experienced a transient episode of left arm numbness, as well as numbness and drooping in the left side of his face.  His symptoms resolved quickly, and recurred several hours later after which she came to the emergency department.  Son notes that he also had some transient facial drooping noted yesterday. He has otherwise been in his normal state of health    TIA, CVA workup  left facial numbness and left sided UE weakness- resolved, multiple episodes since yesterday, r/o cva    neuro checks  am labs  passes bedside s/s eval   MRI in AM-paperwork completed and order  current smoker- cessation advised  lipid profile in AM  TTE abnormal  carotid doppler in am  CTH- Moderate focal stenosis at the origin of one of the M2 segments on the left MCA.Persistent fetal origin left PCA with hypoplastic P1 segment. Mild to moderate focal stenoses suspected in bilateral P2 segments.  ED discussed tcase with tele- neurology, they recommended admission for MRI.  Patient is already on aspirin, neuro not recommending Plavix prior to MRI.  Patient remains at baseline, no deficits, NIH score 0.   c/w asa, dc plavix as per neuro  HTN- c/w losartan, BB, clonidine  apprec neuro recs dr gamboa    vte ppx- Lovenox    gi ppx- ppi        
62-year-old male with past medical history of smoking  hypertension and bilateral carotid stenosis s/p stent (R) and CEA (L)  presents to the ED for evaluation of neurologic symptoms.  He is accompanied by his son who contributes this history.  States that around 930 this morning, while getting out of his car, he experienced a transient episode of left arm numbness, as well as numbness and drooping in the left side of his face.  His symptoms resolved quickly, and recurred several hours later after which she came to the emergency department.  Son notes that he also had some transient facial drooping noted yesterday. He has otherwise been in his normal state of health    r/o TIA, CVA   left facial numbness and left sided UE weakness- resolved,  neuro checks  MRI today  current smoker- prior cessation advised  lipid profile WNL. Continue high intensity statin  CTH- Moderate focal stenosis at the origin of one of the M2 segments on the left MCA.Persistent fetal origin left PCA with hypoplastic P1 segment. Mild to moderate focal stenoses suspected in bilateral P2 segments.  c/w asa, dc plavix as per neuro  apprec neuro recs    HFmrEF  -TTE EF 40%   -ekg abnormal  -patient refusing further inpatient cardiac workup  -cardiology recs      HTN- c/w losartan, BB, clonidine    vte ppx- Lovenox    gi ppx- ppi    Will update holley Ku     Dispo: home +/- 24 hrs. Will need close outpatient cardiology follow up

## 2023-06-20 NOTE — DISCHARGE NOTE PROVIDER - NSDCMRMEDTOKEN_GEN_ALL_CORE_FT
aspirin 81 mg oral tablet: 1 tab(s) orally once a day  cloNIDine 0.1 mg oral tablet: 1 tab(s) orally once a day  Lipitor 80 mg oral tablet: 1 tab(s) orally once a day  losartan-hydroCHLOROthiazide 100 mg-25 mg oral tablet: 1 tab(s) orally once a day  Toprol- mg oral tablet, extended release: 1 tab(s) orally once a day

## 2023-06-20 NOTE — DISCHARGE NOTE NURSING/CASE MANAGEMENT/SOCIAL WORK - PATIENT PORTAL LINK FT
You can access the FollowMyHealth Patient Portal offered by [facility] by registering at the following website: [email or web address]. By joining [employer]’s FollowMyHealth portal, you will also be able to view your health information using other applications (apps) compatible with our system.

## 2023-06-20 NOTE — DISCHARGE NOTE NURSING/CASE MANAGEMENT/SOCIAL WORK - NSDCPEEMAIL_GEN_ALL_CORE
[facility] for Tobacco Control email [email or web address]

## 2023-06-20 NOTE — DISCHARGE NOTE PROVIDER - HOSPITAL COURSE
62-year-old male with past medical history of smoking  hypertension and bilateral carotid stenosis s/p stent (R) and CEA (L)  presents to the ED for evaluation of neurologic symptoms.  At home he experienced a transient episode of left arm numbness, as well as numbness and drooping in the left side of his face.  His symptoms resolved quickly. Symptoms recurred several hours later and he came to the emergency department.  Patient admitted to telemetry for TIA and cardiomyopathy.   Neurology: CT head consistent with left MCA moderate focal stenosis, patent right ICA stent. CTA COW:  Moderate focal stenosis at the origin of one of the M2 segments on the left MCA. Persistent fetal origin left PCA with hypoplastic P1 segment. Mild to moderate focal stenoses suspected in bilateral P2 segments. CT PERFUSION demonstrated: No core infarct. No active ischemia.   MRI: (######)  Cardiology: consulted due to abnormal echocardiogram findings with LVEF ~40% with moderate global left ventricle hypokinesis and abnormal LV strain. Telemetry consistent with normal sinus rhythm and lateral T wave inversions, troponins not elevated, does not appear to be an acute coronary syndrome.  Cardiology recommended inpatient ischemia work-up given EF<40% and EKG changes. Risks, benefits and alternatives were reviewed with patient. Verbalized understanding. Patient refused. Outpatient testing to be scheduled.                 62-year-old male with past medical history of smoking  hypertension and bilateral carotid stenosis s/p stent (R) and CEA (L)  presents to the ED for evaluation of neurologic symptoms.  At home he experienced a transient episode of left arm numbness, as well as numbness and drooping in the left side of his face.  His symptoms resolved quickly. Symptoms recurred several hours later and he came to the emergency department.  Patient admitted to telemetry for TIA and cardiomyopathy.   Neurology: CT head consistent with left MCA moderate focal stenosis, patent right ICA stent. CTA COW:  Moderate focal stenosis at the origin of one of the M2 segments on the left MCA. Persistent fetal origin left PCA with hypoplastic P1 segment. Mild to moderate focal stenoses suspected in bilateral P2 segments. CT PERFUSION demonstrated: No core infarct. No active ischemia.   MRI 6/20/23 showing no stroke.   Cardiology: consulted due to abnormal echocardiogram findings with LVEF ~40% with moderate global left ventricle hypokinesis and abnormal LV strain. Telemetry consistent with normal sinus rhythm and lateral T wave inversions, troponins not elevated, does not appear to be an acute coronary syndrome.  Cardiology recommended inpatient ischemia work-up given EF<40% and EKG changes. Risks, benefits and alternatives were reviewed with patient. Verbalized understanding. Patient refused. Outpatient testing to be scheduled.

## 2023-06-20 NOTE — DISCHARGE NOTE NURSING/CASE MANAGEMENT/SOCIAL WORK - NSDCPEWEB_GEN_ALL_CORE
[facility] for Tobacco Control website --- [email or web address] --- [email or web address]

## 2023-06-20 NOTE — DISCHARGE NOTE PROVIDER - NSDCPNSUBOBJ_GEN_ALL_CORE
Patient is a 62y old  Male who presents with a chief complaint of TIA (2023 10:07)      Patient seen and examined at bedside. No complaints offered.  : Bedside PCA  ALLERGIES:  No Known Allergies    MEDICATIONS  (STANDING):  aspirin enteric coated 81 milliGRAM(s) Oral daily  atorvastatin 80 milliGRAM(s) Oral at bedtime  cloNIDine 0.1 milliGRAM(s) Oral daily  enoxaparin Injectable 40 milliGRAM(s) SubCutaneous every 24 hours  losartan 25 milliGRAM(s) Oral daily  metoprolol succinate  milliGRAM(s) Oral daily  pantoprazole    Tablet 40 milliGRAM(s) Oral before breakfast    MEDICATIONS  (PRN):  acetaminophen     Tablet .. 650 milliGRAM(s) Oral every 6 hours PRN Temp greater or equal to 38C (100.4F), Mild Pain (1 - 3)  aluminum hydroxide/magnesium hydroxide/simethicone Suspension 30 milliLiter(s) Oral every 4 hours PRN Dyspepsia  melatonin 3 milliGRAM(s) Oral at bedtime PRN Insomnia  ondansetron Injectable 4 milliGRAM(s) IV Push every 8 hours PRN Nausea and/or Vomiting    Vital Signs Last 24 Hrs  T(F): 98.2 (2023 05:13), Max: 99.2 (2023 19:54)  HR: 70 (2023 05:13) (67 - 70)  BP: 159/81 (2023 05:13) (136/85 - 186/85)  RR: 16 (2023 05:13) (16 - 17)  SpO2: 98% (2023 05:13) (98% - 100%)  I&O's Summary    2023 07:01  -  2023 07:00  --------------------------------------------------------  IN: 0 mL / OUT: 800 mL / NET: -800 mL      PHYSICAL EXAM:  General: NAD, A/O x 3  ENT: MMM, no oral thrush   Neck: Supple, No JVD  Lungs: Clear to auscultation bilaterally, non labored breathing  Cardio: RRR, S1/S2, No murmurs  Abdomen: Soft, Nontender, Nondistended; Bowel sounds present  Extremities: No calf tenderness, No pitting edema    LABS:                        13.2   6.13  )-----------( 198      ( 2023 05:56 )             37.9     06-20    138  |  101  |  10  ----------------------------<  98  3.9   |  32  |  1.03    Ca    8.9      2023 05:56    TPro  7.0  /  Alb  3.2  /  TBili  0.3  /  DBili  x   /  AST  20  /  ALT  36  /  AlkPhos  81  06-20      PT/INR - ( 2023 17:29 )   PT: 13.7 sec;   INR: 1.18 ratio         PTT - ( 2023 17:29 )  PTT:31.5 sec    CARDIAC MARKERS ( 2023 19:28 )  x     / 19.1 ng/L / x     / x     / x      CARDIAC MARKERS ( 2023 17:29 )  x     / 20.0 ng/L / x     / x     / x          -19 Chol 148 mg/dL LDL -- HDL 42 mg/dL Trig 146 mg/dL  TSH 2.932   TSH with FT4 reflex --  Total T3 --                  Urinalysis Basic - ( 2023 19:00 )    Color: Yellow / Appearance: Clear / S.022 / pH: x  Gluc: x / Ketone: Negative mg/dL  / Bili: Negative / Urobili: 0.2 mg/dL   Blood: x / Protein: Negative mg/dL / Nitrite: Negative   Leuk Esterase: Negative / RBC: x / WBC x   Sq Epi: x / Non Sq Epi: x / Bacteria: x    PHYSICAL EXAM:  GENERAL- NAD  EAR/NOSE/MOUTH/THROAT -  MMM  EYES- PERLLA, conjunctiva and Sclera clear  NECK- supple  RESPIRATORY-  clear to auscultation bilaterally, non laboured breathing  CARDIOVASCULAR - SIS2, RRR  GI - soft NT BS present  EXTREMITIES- no pedal edema  NEUROLOGY- no gross focal deficits; CN II-XII intact.  SKIN- no rashes, warm to touch  PSYCHIATRY- AAO X 3  MUSCULOSKELETAL- ROM normal    · Assessment    62-year-old male with past medical history of smoking  hypertension and bilateral carotid stenosis s/p stent (R) and CEA (L)  presents to the ED for evaluation of neurologic symptoms.  He is accompanied by his son who contributes this history.  States that around 930 this morning, while getting out of his car, he experienced a transient episode of left arm numbness, as well as numbness and drooping in the left side of his face.  His symptoms resolved quickly, and recurred several hours later after which she came to the emergency department.  Son notes that he also had some transient facial drooping noted yesterday. He has otherwise been in his normal state of health    TIA, CVA workup  left facial numbness and left sided UE weakness- resolved, CVA negative  Patient remains at baseline, no deficits, NIH score 0.     neuro checks  passes bedside s/s eval   MRI today  current smoker  TTE abnormal EF<40%- cardiology consult appreciated  carotid doppler   #TIA  - CTH- Moderate focal stenosis at the origin of one of the M2 segments on the left MCA.Persistent fetal origin left PCA with hypoplastic P1 segment. Mild to moderate focal stenoses suspected in bilateral P2 segments.  - Patient is on aspirin, neuro not recommending Plavix prior to MRI.    - Continue high dose statin- Atorvastatin 80mg bedtime    #HTN  - losartan, BB    #VTE  - Patient ambulating  - on hold Lovenox    #GI PPX  - ppi          RADIOLOGY & ADDITIONAL TESTS:  < from: US Duplex Carotid Arteries Complete, Bilateral (23 @ 16:08) >    PROCEDURE DATE:  2023          INTERPRETATION:  CLINICAL INFORMATION: TIA. Carotid stenosis.    COMPARISON: No prior carotid sonography for comparison.    TECHNIQUE: Grayscale, color and spectral Doppler examination of both   carotid arteries was performed.    FINDINGS:    No elevated velocities or abnormal waveforms are encountered. Mild right   and a mild to moderate left-sided atherosclerotic change. The right ICA   stent.    Peak systolic velocities are as follows:    RIGHT:  PROX CCA = 41 cm/s  DIST CCA = 45 cm/s  PROX ICA = 96 cm/s  DIST ICA = 77 cm/s  ECA = 133 cm/s    LEFT:  PROX CCA = 38 cm/s  DIST CCA = 53 cm/s  PROX ICA = 65 cm/s  DIST ICA = 113 cm/s  ECA = 63 cm/s    Antegrade flow is noted within both vertebral arteries. Peak systolic   velocity of the right vertebral artery is 1 21 cm/s and of the left   vertebral artery 92 cm/s.    IMPRESSION: No significant hemodynamic stenosis of either carotid artery.   Patent right ICA stent.  Elevated peak systolic velocities of the vertebral arteries bilaterally,   etiology undetermined.    Measurement of carotid stenosis is based on velocity parameters that   correlate the residual internal carotid diameter with that of the more   distal vessel in accordance with a method such as the [place] American   Symptomatic Carotid Endarterectomy Trial (NASCET).    --- End of Report ---    < end of copied text >    < from: US Duplex Carotid Arteries Complete, Bilateral (23 @ 16:08) >        Care Discussed with Consultants/Other Providers:  Discussed case with Cardiology

## 2023-06-20 NOTE — DISCHARGE NOTE PROVIDER - NSDCCPCAREPLAN_GEN_ALL_CORE_FT
PRINCIPAL DISCHARGE DIAGNOSIS  Diagnosis: Transient ischemic attack (TIA)  Assessment and Plan of Treatment: You have been diagnosied with a transient ischemic attack. Contniue atorvastatin 80mg at bedtime and aspirin 81mg daily. Follow-up with your PCP Dr. Chong within 1 week. Red flags to return to the emergency department: symptoms return or worsening symptoms of weakness, facial drooping, vision changes, numbness, headache, imbalance, difficulty talking, seizure.      SECONDARY DISCHARGE DIAGNOSES  Diagnosis: Cardiomyopathy, unspecified  Assessment and Plan of Treatment: You have bee diagnosised with cardiomyopathy. Your ejection fraction is low. Your heart's ability to pump is compromised. Inpatient work-up was recommend and you refused so you need to see your cardiologist within one week to schedule an urgent outpatient ischemic work-up. Continue your metrtoprolol and losartan/HCTZ. Red flags to return to the emergency room include chest pain, shortness of breath, difficulty walking, fatigue.    Diagnosis: Smoking  Assessment and Plan of Treatment: Stop smoking.     PRINCIPAL DISCHARGE DIAGNOSIS  Diagnosis: Transient ischemic attack (TIA)  Assessment and Plan of Treatment: You have been diagnosied with a transient ischemic attack. Contniue atorvastatin 80mg at bedtime and aspirin 81mg daily. Follow-up with your PCP Dr. Chong within 1 week. Red flags to return to the emergency department: symptoms return or worsening symptoms of weakness, facial drooping, vision changes, numbness, headache, imbalance, difficulty talking, seizure.      SECONDARY DISCHARGE DIAGNOSES  Diagnosis: Smoking  Assessment and Plan of Treatment: Stop smoking.    Diagnosis: Heart failure with reduced ejection fraction  Assessment and Plan of Treatment: Your LV ejection fraction is 40 percent and considered low. Your heart does not pump as well as it should. Your EKG was also abnormal. Further inpatient workup was recommended by cardiology and you have refused. Please follow up closely with your cardiologist in the next 1-2 weeks to shedule an outpatient ischemic work up. Continue taking Metoprolol  and Losartan/HCTZ along with your other home meds. Return to the ED for chest pain worsening shorntess of breath or other complaints.

## 2024-01-01 NOTE — ED PROVIDER NOTE - IV ALTEPLASE INCLUSION HIDDEN
Prior to immunization administration, verified patients identity using patient s name and date of birth. Please see Immunization Activity for additional information.     Screening Questionnaire for Pediatric Immunization    Is the child sick today?   No   Does the child have allergies to medications, food, a vaccine component, or latex?   No   Has the child had a serious reaction to a vaccine in the past?   No   Does the child have a long-term health problem with lung, heart, kidney or metabolic disease (e.g., diabetes), asthma, a blood disorder, no spleen, complement component deficiency, a cochlear implant, or a spinal fluid leak?  Is he/she on long-term aspirin therapy?   No   If the child to be vaccinated is 2 through 4 years of age, has a healthcare provider told you that the child had wheezing or asthma in the  past 12 months?   No   If your child is a baby, have you ever been told he or she has had intussusception?   No   Has the child, sibling or parent had a seizure, has the child had brain or other nervous system problems?   No   Does the child have cancer, leukemia, AIDS, or any immune system         problem?   No   Does the child have a parent, brother, or sister with an immune system problem?   No   In the past 3 months, has the child taken medications that affect the immune system such as prednisone, other steroids, or anticancer drugs; drugs for the treatment of rheumatoid arthritis, Crohn s disease, or psoriasis; or had radiation treatments?   No   In the past year, has the child received a transfusion of blood or blood products, or been given immune (gamma) globulin or an antiviral drug?   No   Is the child/teen pregnant or is there a chance that she could become       pregnant during the next month?   No   Has the child received any vaccinations in the past 4 weeks?   No               Immunization questionnaire answers were all negative.      Patient instructed to remain in clinic for 15 minutes  afterwards, and to report any adverse reactions.     Screening performed by Sasha Pike MA on 2024 at 9:58 AM.     show

## 2024-10-15 NOTE — ED PROVIDER NOTE - NIH STROKE SCALE: 2. BEST GAZE, QM
Quality 47: Advance Care Plan: Advance Care Planning discussed and documented; advance care plan or surrogate decision maker documented in the medical record. Quality 431: Preventive Care And Screening: Unhealthy Alcohol Use - Screening: Patient identified as an unhealthy alcohol user when screened for unhealthy alcohol use using a systematic screening method and received brief counseling Quality 226: Preventive Care And Screening: Tobacco Use: Screening And Cessation Intervention: Patient screened for tobacco use and is an ex/non-smoker Detail Level: Detailed (0) Normal